# Patient Record
Sex: MALE | Race: WHITE | Employment: FULL TIME | URBAN - METROPOLITAN AREA
[De-identification: names, ages, dates, MRNs, and addresses within clinical notes are randomized per-mention and may not be internally consistent; named-entity substitution may affect disease eponyms.]

---

## 2017-01-14 ENCOUNTER — OFFICE VISIT (OUTPATIENT)
Dept: FAMILY MEDICINE CLINIC | Facility: CLINIC | Age: 57
End: 2017-01-14

## 2017-01-14 VITALS
HEIGHT: 61 IN | HEART RATE: 68 BPM | DIASTOLIC BLOOD PRESSURE: 72 MMHG | BODY MASS INDEX: 28.39 KG/M2 | WEIGHT: 150.38 LBS | SYSTOLIC BLOOD PRESSURE: 130 MMHG | RESPIRATION RATE: 16 BRPM

## 2017-01-14 DIAGNOSIS — E11.65 UNCONTROLLED TYPE 2 DIABETES MELLITUS WITH HYPERGLYCEMIA, WITHOUT LONG-TERM CURRENT USE OF INSULIN (HCC): Primary | ICD-10-CM

## 2017-01-14 DIAGNOSIS — I10 ESSENTIAL HYPERTENSION: ICD-10-CM

## 2017-01-14 DIAGNOSIS — E78.2 MIXED HYPERLIPIDEMIA: ICD-10-CM

## 2017-01-14 LAB
CARTRIDGE LOT#: 649 NUMERIC
HEMOGLOBIN A1C: 8.2 % (ref 4.3–5.6)

## 2017-01-14 PROCEDURE — 99214 OFFICE O/P EST MOD 30 MIN: CPT | Performed by: NURSE PRACTITIONER

## 2017-01-14 PROCEDURE — 83036 HEMOGLOBIN GLYCOSYLATED A1C: CPT | Performed by: NURSE PRACTITIONER

## 2017-01-14 RX ORDER — LANCETS
EACH MISCELLANEOUS
Qty: 300 EACH | Refills: 3 | Status: SHIPPED | OUTPATIENT
Start: 2017-01-14

## 2017-01-14 RX ORDER — ATORVASTATIN CALCIUM 40 MG/1
40 TABLET, FILM COATED ORAL NIGHTLY
Qty: 30 TABLET | Refills: 2 | Status: SHIPPED | OUTPATIENT
Start: 2017-01-14 | End: 2017-04-13

## 2017-01-14 RX ORDER — LISINOPRIL AND HYDROCHLOROTHIAZIDE 20; 12.5 MG/1; MG/1
1 TABLET ORAL
Qty: 90 TABLET | Refills: 3 | Status: SHIPPED | OUTPATIENT
Start: 2017-01-14 | End: 2018-01-26

## 2017-01-14 RX ORDER — LACTOBACIL 2/BIFIDO 1/S.THERMO 450B CELL
1 PACKET (EA) ORAL DAILY
Qty: 90 CAPSULE | Refills: 3 | Status: SHIPPED | OUTPATIENT
Start: 2017-01-14 | End: 2017-08-21

## 2017-01-14 RX ORDER — METFORMIN HYDROCHLORIDE 750 MG/1
1500 TABLET, EXTENDED RELEASE ORAL DAILY
Qty: 60 TABLET | Refills: 2 | Status: SHIPPED | OUTPATIENT
Start: 2017-01-14 | End: 2017-05-31

## 2017-01-14 NOTE — PATIENT INSTRUCTIONS
Switch from Metformin to Metformin XR 1500 mg daily. Start with 1 pill daily for 1 week and then increase to prescribed dose of 2 daily. Start Lipitor for cholesterol.   HgbA1c today is improved at 8.2, goal <7.0  Complete fasting (10-12 hours, water only) your palm. 1 Cup: 1 cup (or a medium-sized piece) is about the size of your fist.       1/2 Cup: 1/2 cup is about the size of your cupped hand. One tablespoon is about the size of your thumb.   One teaspoon is about the size of the tip of your th steam, sauté, stir-patel, or microwave instead. Overcooking destroys vitamins, so try to keep vegetables a little crispy. · Using a lot of margarine, butter, or salad dressing adds fat and calories, but not many nutrients.  A small amount of these toppings i

## 2017-01-14 NOTE — PROGRESS NOTES
HPI:   Renetta lBackwood is a 55year old male who presents for recheck of his diabetes, HTN, hyperlipidemia.     Patient presents with:  Diabetes: Advised pt that he get diabetic eye exam.  Hypertension    Patient’s FBS have not been checked, no glucomete Lisinopril-Hydrochlorothiazide 20-12.5 MG Oral Tab Take 1 tablet by mouth once daily. Disp: 90 tablet Rfl: 3   MICROLET LANCETS Does not apply Misc TEST DAILY AS DIRECTED Disp: 300 each Rfl: 3      No past medical history on file.    Social History    Noreen EXTREMITIES: no cyanosis, clubbing or edema.  Bilateral barefoot skin diabetic exam is normal, visualized feet and the appearance is normal.  Bilateral monofilament/sensation of both feet is normal.  Pulsation pedal pulse exam of both lower legs/feet is nor Many different words are used to describe amounts of food. If your health care provider uses a term you’re not sure of, don’t be afraid to ask.  It helps to know the difference between servings and portions:  · A serving size is a fixed size. Food producers © 2749-5569 [address], [address] [facility]. All rights reserved. This information is not intended as a substitute for professional medical care. Always follow your healthcare professional's instructions.         [employer] © 3873-0438 [address], [address] [place]. All rights reserved. This information is not intended as a substitute for professional medical care. Always follow your healthcare professional's instructions.               R

## 2017-01-31 ENCOUNTER — TELEPHONE (OUTPATIENT)
Dept: FAMILY MEDICINE CLINIC | Facility: CLINIC | Age: 57
End: 2017-01-31

## 2017-01-31 NOTE — TELEPHONE ENCOUNTER
Patient was dx with diabetes in 11/2016 when he saw Charles Holcomb. His diabetes was discussed and education provided at time of LOV. It sounds like further education is needed, please have him f/u to discuss treatment plan.  Especially if not tolerating Metformin

## 2017-01-31 NOTE — TELEPHONE ENCOUNTER
Patient was initially prescribed Metformin 500mg bid and then was switched to Metformin ER 750mg bid due to GI side effects. Pt is still concerned about the amount of diarrhea he is having.  He stopped taking Metformin 2 days ago and states his sx have i

## 2017-01-31 NOTE — TELEPHONE ENCOUNTER
Reviewed below with patient. He wishes to try changing his dosing schedule of his meds x 1 week to see if it makes a difference. He feels the GI sx returned when he took his BP med and Metformin together.  He will try taking Metformin at breakfast, BP med a

## 2017-01-31 NOTE — TELEPHONE ENCOUNTER
Patient would like to speak to nurse. Patient has been taking Lisinopril-Hydrochlorothiazide, Atorvastatin Calcium, and MetFORMIN HCl . Patient feels medication has been making him sick.

## 2017-04-06 ENCOUNTER — TELEPHONE (OUTPATIENT)
Dept: FAMILY MEDICINE CLINIC | Facility: CLINIC | Age: 57
End: 2017-04-06

## 2017-04-06 NOTE — TELEPHONE ENCOUNTER
Patient wants to know if he needs to keep taking his    Probiotic Product (Cap) VSL#3     He has one pill left but was never told if he should remain on it or if he is done taking it after the 60 pills prescribed. Please call him back to discuss.

## 2017-04-06 NOTE — TELEPHONE ENCOUNTER
I typically recommend a regular probiotic year round. If he has been on VSL#3 therapy daily for 1 month he can now reduce to every other day for maintenance (this will reduce cost).  Make sure to take with cold beverage so as to not deactivate the good bact

## 2017-04-07 NOTE — TELEPHONE ENCOUNTER
VSL#3 is an OTC brand, no prescription required. It is only behind the pharmacy because it requires refrigeration.

## 2017-04-07 NOTE — TELEPHONE ENCOUNTER
Called and talked to patient he took the VSL 3 for 2 months and is almost out.  He wants to know if there is and OTC brand that he could switch to?

## 2017-04-08 NOTE — TELEPHONE ENCOUNTER
To my knowledge the VSL#3 taken every other day for maintenance will be the least expensive. Cost is typically around $47 for bottle of 60, taken QOD brings cost down to about $11/month.  If he still would like alternative than I recommend [employer] or [email or web address]

## 2017-04-10 NOTE — TELEPHONE ENCOUNTER
Pt called back about Probiotic recommendations. Pt now says he feels he does not need any of these medications. Pt says he is thinking about trying to see a Internist. Pt says he is a Glucometer is broken because his sugars have been all over the place.  Pt

## 2017-04-10 NOTE — TELEPHONE ENCOUNTER
Read to pt previous notes but he would still like a call back on this matter. Carlos Eduardo Washington Has questions

## 2017-04-13 DIAGNOSIS — E78.5 HYPERLIPIDEMIA, UNSPECIFIED HYPERLIPIDEMIA TYPE: Primary | ICD-10-CM

## 2017-04-13 RX ORDER — ATORVASTATIN CALCIUM 40 MG/1
TABLET, FILM COATED ORAL
Qty: 30 TABLET | Refills: 2 | Status: SHIPPED | OUTPATIENT
Start: 2017-04-13 | End: 2017-05-31

## 2017-05-31 ENCOUNTER — OFFICE VISIT (OUTPATIENT)
Dept: INTERNAL MEDICINE CLINIC | Facility: CLINIC | Age: 57
End: 2017-05-31

## 2017-05-31 VITALS
HEART RATE: 84 BPM | SYSTOLIC BLOOD PRESSURE: 124 MMHG | BODY MASS INDEX: 29.07 KG/M2 | TEMPERATURE: 99 F | WEIGHT: 154 LBS | HEIGHT: 61 IN | DIASTOLIC BLOOD PRESSURE: 78 MMHG | RESPIRATION RATE: 16 BRPM

## 2017-05-31 DIAGNOSIS — F17.200 SMOKER: ICD-10-CM

## 2017-05-31 DIAGNOSIS — E78.5 HYPERLIPIDEMIA LDL GOAL <100: ICD-10-CM

## 2017-05-31 DIAGNOSIS — I10 ESSENTIAL HYPERTENSION: ICD-10-CM

## 2017-05-31 DIAGNOSIS — K52.1 DIARRHEA DUE TO DRUG: ICD-10-CM

## 2017-05-31 DIAGNOSIS — IMO0001 UNCONTROLLED TYPE 2 DIABETES MELLITUS WITHOUT COMPLICATION, WITHOUT LONG-TERM CURRENT USE OF INSULIN: Primary | ICD-10-CM

## 2017-05-31 PROCEDURE — 82962 GLUCOSE BLOOD TEST: CPT | Performed by: INTERNAL MEDICINE

## 2017-05-31 PROCEDURE — 99204 OFFICE O/P NEW MOD 45 MIN: CPT | Performed by: INTERNAL MEDICINE

## 2017-05-31 RX ORDER — GLIPIZIDE 5 MG/1
5 TABLET, FILM COATED, EXTENDED RELEASE ORAL DAILY
Qty: 30 TABLET | Refills: 3 | Status: SHIPPED | OUTPATIENT
Start: 2017-05-31 | End: 2017-08-21 | Stop reason: DRUGHIGH

## 2017-05-31 RX ORDER — ROSUVASTATIN CALCIUM 10 MG/1
10 TABLET, COATED ORAL NIGHTLY
Qty: 30 TABLET | Refills: 2 | Status: SHIPPED | OUTPATIENT
Start: 2017-05-31 | End: 2017-06-29

## 2017-05-31 NOTE — PROGRESS NOTES
Jessica Hdez is a 55year old male.   Patient presents with:  Diabetes  Cholesterol  HTN      HPI:     Patient here for establishing care-  DM2 - diagnosed last year, given metformin but not tolerating due to diarrhea (even on the ER form), stopped ta Quit date: 10/04/2016    Smokeless Status: Never Used                        Alcohol Use: Yes           1.2 - 1.8 oz/week       2-3 Standard drinks or equivalent per week       Comment: beer Cage 5/31/17    No family history on file.      Allergies  No Know see me back in 4-6 weeks with BG log  Diarrhea due to drug- r/t metformin  Hyperlipidemia ldl goal <100 - start generic crestor 10mg daily, SE reviewed, check labs in 4-6 weeks  HTN- controlled.  cpm  Smoker- advised to quit and pt says he is able to do [facility]

## 2017-06-10 ENCOUNTER — DIABETIC EDUCATION (OUTPATIENT)
Dept: ENDOCRINOLOGY CLINIC | Facility: CLINIC | Age: 57
End: 2017-06-10

## 2017-06-10 VITALS — WEIGHT: 154.38 LBS | BODY MASS INDEX: 29 KG/M2

## 2017-06-10 DIAGNOSIS — IMO0001 UNCONTROLLED TYPE 2 DIABETES MELLITUS WITHOUT COMPLICATION, WITHOUT LONG-TERM CURRENT USE OF INSULIN: Primary | ICD-10-CM

## 2017-06-10 PROCEDURE — G0108 DIAB MANAGE TRN  PER INDIV: HCPCS | Performed by: DIETITIAN, REGISTERED

## 2017-06-10 NOTE — PROGRESS NOTES
Caprice Ayon  : 1960 attended Step 1 Diabetic Education:    Date: 6/10/2017   Start time: 9:00 End time: 10:00    Wt 154 lb 6.4 oz      HEMOGLOBIN A1C (%)   Date Value   2017 8.2*   ----------     Diabetes Overview, pathophysiology, A1

## 2017-06-29 DIAGNOSIS — E78.5 HYPERLIPIDEMIA LDL GOAL <100: ICD-10-CM

## 2017-06-29 RX ORDER — ROSUVASTATIN CALCIUM 10 MG/1
10 TABLET, COATED ORAL NIGHTLY
Qty: 30 TABLET | Refills: 2 | Status: SHIPPED | OUTPATIENT
Start: 2017-06-29 | End: 2017-08-21 | Stop reason: DRUGHIGH

## 2017-06-29 NOTE — TELEPHONE ENCOUNTER
Patient was given a prescription for Crestor and filled the rx at [facility] and he has a coupon that if he uses it a the [facility] [place] he can get it much cheaper.   [facility]

## 2017-06-30 NOTE — ADDENDUM NOTE
Encounter addended by: Yuliya Bustillo MA on: 6/30/2017  2:13 PM<BR>    Actions taken: Letter status changed

## 2017-07-15 PROCEDURE — 80061 LIPID PANEL: CPT | Performed by: INTERNAL MEDICINE

## 2017-07-15 PROCEDURE — 85025 COMPLETE CBC W/AUTO DIFF WBC: CPT | Performed by: INTERNAL MEDICINE

## 2017-07-15 PROCEDURE — 80053 COMPREHEN METABOLIC PANEL: CPT | Performed by: INTERNAL MEDICINE

## 2017-07-15 PROCEDURE — 84439 ASSAY OF FREE THYROXINE: CPT | Performed by: INTERNAL MEDICINE

## 2017-07-15 PROCEDURE — 83036 HEMOGLOBIN GLYCOSYLATED A1C: CPT | Performed by: INTERNAL MEDICINE

## 2017-07-15 PROCEDURE — 84443 ASSAY THYROID STIM HORMONE: CPT | Performed by: INTERNAL MEDICINE

## 2017-07-15 NOTE — PROGRESS NOTES
Shannon De La Torre is a 55year old male.   Patient presents with:  Diabetes: follow up and refills       HPI:     Patient here for f/u on diabetes, did not do labs so drawn today  DM2 - taking glipizide , diarrhea resolved not that we stopped metformin, no equivalent: 2 - 3 per week     Comment: beer Cage 5/31/17    No family history on file.      Allergies  No Known Allergies      REVIEW OF SYSTEMS:   GENERAL HEALTH:  no fevers or chills  SKIN: denies any unusual skin lesions or rashes  RESPIRATORY: no cough 8/26/2017) for diabetes- bring log and check BP if you can as outpatient. There are no Patient Instructions on file for this visit. The patient indicates understanding of these issues and agrees to the plan.

## 2017-07-16 ENCOUNTER — TELEPHONE (OUTPATIENT)
Dept: INTERNAL MEDICINE CLINIC | Facility: CLINIC | Age: 57
End: 2017-07-16

## 2017-07-16 DIAGNOSIS — R94.6 BORDERLINE ABNORMAL TFTS: Primary | ICD-10-CM

## 2017-07-16 DIAGNOSIS — N28.9 RENAL INSUFFICIENCY: ICD-10-CM

## 2017-07-17 ENCOUNTER — TELEPHONE (OUTPATIENT)
Dept: INTERNAL MEDICINE CLINIC | Facility: CLINIC | Age: 57
End: 2017-07-17

## 2017-07-17 NOTE — TELEPHONE ENCOUNTER
See result  notes Principal Discharge DX:	Cellulitis of right lower extremity  Goal:	Resolution of infection  Instructions for follow-up, activity and diet:	You were found to have an infection of your right lower extremity. You were treated with IV antibiotics.  Secondary Diagnosis:	Diabetes mellitus type II, uncontrolled  Instructions for follow-up, activity and diet:	Your blood glucose was elevated in-hospital. Please follow up with the endocrinologist outpatient in 1-2 weeks. Please call [phone] to make an appointment. Principal Discharge DX:	Cellulitis of right lower extremity  Goal:	Resolution of infection  Instructions for follow-up, activity and diet:	You were found to have an infection of your right lower extremity. You were treated with IV antibiotics.  Continue oral antibiotics until completed as ordered  Secondary Diagnosis:	Diabetes mellitus type II, uncontrolled  Instructions for follow-up, activity and diet:	Your blood glucose was elevated in-hospital. Please follow up with the endocrinologist outpatient in 1-2 weeks. Please call [phone] to make an appointment. Principal Discharge DX:	Cellulitis of right lower extremity  Goal:	Resolution of infection  Instructions for follow-up, activity and diet:	You were found to have an infection of your right lower extremity. You were treated with IV antibiotics.  Continue oral antibiotics until completed as ordered  Secondary Diagnosis:	Diabetes mellitus type II, uncontrolled  Instructions for follow-up, activity and diet:	Your blood glucose was elevated in-hospital. Please follow up with the endocrinologist outpatient in 1-2 weeks. Please call [phone] to make an appointment. Principal Discharge DX:	Cellulitis of right lower extremity  Goal:	Resolution of infection  Instructions for follow-up, activity and diet:	You were found to have an infection of your right lower extremity. You were treated with IV antibiotics.  Continue oral antibiotics until completed as ordered  Secondary Diagnosis:	Diabetes mellitus type II, uncontrolled  Instructions for follow-up, activity and diet:	Your blood glucose was elevated in-hospital and changes to your diabetes medication regimen have been made. Please follow up with the endocrinologist outpatient in 1-2 weeks. Please call [phone] to make an appointment. Principal Discharge DX:	Cellulitis of right lower extremity  Goal:	Resolution of infection  Instructions for follow-up, activity and diet:	You were found to have an infection of your right lower extremity. You were treated with IV antibiotics.  Continue oral antibiotics until completed as ordered  Secondary Diagnosis:	Diabetes mellitus type II, uncontrolled  Instructions for follow-up, activity and diet:	Your blood glucose was elevated in-hospital and changes to your diabetes medication regimen have been made. Please follow up with the endocrinologist outpatient in 1-2 weeks.   [facility] Endocrinology at [address], [phone], and [address] [place], [phone] Please call [phone] to make an appointment. Principal Discharge DX:	Cellulitis of right lower extremity  Goal:	Resolution of infection  Instructions for follow-up, activity and diet:	You were found to have an infection of your right lower extremity. You were treated with IV antibiotics.  Continue oral antibiotics until completed as ordered  Secondary Diagnosis:	Diabetes mellitus type II, uncontrolled  Instructions for follow-up, activity and diet:	Your blood glucose was elevated in-hospital and changes to your diabetes medication regimen have been made. Please follow up with the endocrinologist outpatient in 1-2 weeks.   [facility] Endocrinology at [address], [phone], and [address] [place], [phone] Please call [phone] to make an appointment. Principal Discharge DX:	Cellulitis of right lower extremity  Goal:	Resolution of infection  Instructions for follow-up, activity and diet:	You were found to have an infection of your right lower extremity. You were treated with IV antibiotics.  Continue oral antibiotics until completed as ordered  Secondary Diagnosis:	Diabetes mellitus type II, uncontrolled  Instructions for follow-up, activity and diet:	Your blood glucose was elevated in-hospital and changes to your diabetes medication regimen have been made. Please follow up with the endocrinologist outpatient in 1-2 weeks.   [facility] Endocrinology at [address], [phone], and [address] [place], [phone] Please call [phone] to make an appointment. Principal Discharge DX:	Cellulitis of right lower extremity  Goal:	Resolution of infection  Instructions for follow-up, activity and diet:	You were found to have an infection of your right lower extremity. You were treated with IV antibiotics.  Continue oral antibiotics until completed as ordered  Secondary Diagnosis:	Diabetes mellitus type II, uncontrolled  Instructions for follow-up, activity and diet:	Your blood glucose was elevated in-hospital and changes to your diabetes medication regimen have been made. Please follow up with the endocrinologist outpatient in 1-2 weeks.   [facility] Endocrinology at [address], [phone], and [address] [place], [phone] Please call [phone] to make an appointment. Principal Discharge DX:	Cellulitis of right lower extremity  Goal:	Resolution of infection  Instructions for follow-up, activity and diet:	You were found to have an infection of your right lower extremity. You were treated with IV antibiotics.  Continue oral antibiotics until completed as ordered  Secondary Diagnosis:	Diabetes mellitus type II, uncontrolled  Instructions for follow-up, activity and diet:	Your blood glucose was elevated in-hospital and changes to your diabetes medication regimen have been made. Please follow up with the endocrinologist outpatient in 1-2 weeks.   [facility] Endocrinology at [address], [phone], and [address] [place], [phone] Please call [phone] to make an appointment. Principal Discharge DX:	Cellulitis of right lower extremity  Goal:	Resolution of infection  Instructions for follow-up, activity and diet:	You were found to have an infection of your right lower extremity. You were treated with IV antibiotics.  Continue oral antibiotics until completed as ordered  Secondary Diagnosis:	Diabetes mellitus type II, uncontrolled  Instructions for follow-up, activity and diet:	Your blood glucose was elevated in-hospital and changes to your diabetes medication regimen have been made. Please follow up with the endocrinologist outpatient in 1-2 weeks.   [facility] Endocrinology at [address], [phone], and [address] [place], [phone] Please call [phone] to make an appointment.

## 2017-07-17 NOTE — ADDENDUM NOTE
Encounter addended by: Katie Smart RN on: 7/17/2017  6:44 PM<BR>    Actions taken: Letter status changed

## 2017-08-21 ENCOUNTER — OFFICE VISIT (OUTPATIENT)
Dept: INTERNAL MEDICINE CLINIC | Facility: CLINIC | Age: 57
End: 2017-08-21

## 2017-08-21 ENCOUNTER — APPOINTMENT (OUTPATIENT)
Dept: LAB | Age: 57
End: 2017-08-21
Attending: INTERNAL MEDICINE
Payer: COMMERCIAL

## 2017-08-21 ENCOUNTER — TELEPHONE (OUTPATIENT)
Dept: INTERNAL MEDICINE CLINIC | Facility: CLINIC | Age: 57
End: 2017-08-21

## 2017-08-21 VITALS
BODY MASS INDEX: 29.45 KG/M2 | DIASTOLIC BLOOD PRESSURE: 64 MMHG | SYSTOLIC BLOOD PRESSURE: 120 MMHG | HEIGHT: 61 IN | RESPIRATION RATE: 17 BRPM | WEIGHT: 156 LBS | TEMPERATURE: 98 F | HEART RATE: 78 BPM | OXYGEN SATURATION: 98 %

## 2017-08-21 DIAGNOSIS — N28.9 RENAL INSUFFICIENCY: ICD-10-CM

## 2017-08-21 DIAGNOSIS — IMO0001 UNCONTROLLED TYPE 2 DIABETES MELLITUS WITHOUT COMPLICATION, WITHOUT LONG-TERM CURRENT USE OF INSULIN: ICD-10-CM

## 2017-08-21 DIAGNOSIS — IMO0001 UNCONTROLLED TYPE 2 DIABETES MELLITUS WITHOUT COMPLICATION, WITHOUT LONG-TERM CURRENT USE OF INSULIN: Primary | ICD-10-CM

## 2017-08-21 DIAGNOSIS — R79.89 ABNORMAL TSH: ICD-10-CM

## 2017-08-21 DIAGNOSIS — R94.6 BORDERLINE ABNORMAL TFTS: ICD-10-CM

## 2017-08-21 DIAGNOSIS — F17.200 SMOKER: ICD-10-CM

## 2017-08-21 DIAGNOSIS — E78.2 MIXED HYPERLIPIDEMIA: ICD-10-CM

## 2017-08-21 LAB
BUN BLD-MCNC: 28 MG/DL (ref 8–20)
CALCIUM BLD-MCNC: 9.6 MG/DL (ref 8.3–10.3)
CHLORIDE: 105 MMOL/L (ref 101–111)
CO2: 23 MMOL/L (ref 22–32)
CREAT BLD-MCNC: 1.53 MG/DL (ref 0.7–1.3)
CREAT UR-SCNC: 83.4 MG/DL
FREE T4: 1.2 NG/DL (ref 0.9–1.8)
GLUCOSE BLD-MCNC: 124 MG/DL (ref 70–99)
MICROALBUMIN UR-MCNC: 5.52 MG/DL
MICROALBUMIN/CREAT 24H UR-RTO: 66.2 UG/MG (ref ?–30)
POTASSIUM SERPL-SCNC: 4.4 MMOL/L (ref 3.6–5.1)
SODIUM SERPL-SCNC: 136 MMOL/L (ref 136–144)
T3 SERPL-MCNC: 108 NG/DL (ref 60–181)
TSI SER-ACNC: <0.005 MIU/ML (ref 0.35–5.5)

## 2017-08-21 PROCEDURE — 99214 OFFICE O/P EST MOD 30 MIN: CPT | Performed by: INTERNAL MEDICINE

## 2017-08-21 PROCEDURE — 82043 UR ALBUMIN QUANTITATIVE: CPT

## 2017-08-21 PROCEDURE — 84443 ASSAY THYROID STIM HORMONE: CPT

## 2017-08-21 PROCEDURE — 80048 BASIC METABOLIC PNL TOTAL CA: CPT

## 2017-08-21 PROCEDURE — 82570 ASSAY OF URINE CREATININE: CPT

## 2017-08-21 PROCEDURE — 84439 ASSAY OF FREE THYROXINE: CPT

## 2017-08-21 PROCEDURE — 84480 ASSAY TRIIODOTHYRONINE (T3): CPT

## 2017-08-21 RX ORDER — GLIPIZIDE 10 MG/1
10 TABLET, FILM COATED, EXTENDED RELEASE ORAL DAILY
Qty: 30 TABLET | Refills: 3 | Status: SHIPPED | OUTPATIENT
Start: 2017-08-21 | End: 2017-12-22

## 2017-08-21 RX ORDER — ROSUVASTATIN CALCIUM 5 MG/1
5 TABLET, COATED ORAL NIGHTLY
Qty: 30 TABLET | Refills: 3 | Status: SHIPPED | OUTPATIENT
Start: 2017-08-21 | End: 2017-12-22

## 2017-08-21 NOTE — TELEPHONE ENCOUNTER
Weston Gamez, biometric measures and physical was filled out and faxed to secure fax, conf was received

## 2017-08-21 NOTE — PROGRESS NOTES
Luis Fernando Estrada is a 55year old male.   Patient presents with:  Diabetes: f/u   Headache: 2 weeks, in the AM.  pt states he has never had headaches, thinks its due to change of cholesterol meds,   Lab Results      HPI:     Patient with Dm2, high cholest Types: Cigarettes     Start date: 8/10/2014     Quit date: 10/4/2016  Smokeless tobacco: Never Used                      Alcohol use: Yes           1.2 - 1.8 oz/week     Standard drinks or equivalent: 2 - 3 per week     Comment: beer Cage 5/31/17    No f 5 MG Oral Tab 30 tablet 3      Sig: Take 1 tablet (5 mg total) by mouth nightly. Imaging & Consults:  None    Return in about 2 months (around 10/21/2017). There are no Patient Instructions on file for this visit.       The patient indicates unde

## 2017-11-09 ENCOUNTER — OFFICE VISIT (OUTPATIENT)
Dept: INTERNAL MEDICINE CLINIC | Facility: CLINIC | Age: 57
End: 2017-11-09

## 2017-11-09 ENCOUNTER — LAB ENCOUNTER (OUTPATIENT)
Dept: LAB | Age: 57
End: 2017-11-09
Attending: INTERNAL MEDICINE
Payer: COMMERCIAL

## 2017-11-09 VITALS
SYSTOLIC BLOOD PRESSURE: 118 MMHG | HEIGHT: 61 IN | WEIGHT: 155 LBS | RESPIRATION RATE: 17 BRPM | BODY MASS INDEX: 29.27 KG/M2 | DIASTOLIC BLOOD PRESSURE: 68 MMHG | OXYGEN SATURATION: 98 % | HEART RATE: 87 BPM

## 2017-11-09 DIAGNOSIS — E05.90 SUBCLINICAL HYPERTHYROIDISM: ICD-10-CM

## 2017-11-09 DIAGNOSIS — I10 ESSENTIAL HYPERTENSION: ICD-10-CM

## 2017-11-09 DIAGNOSIS — E78.2 MIXED HYPERLIPIDEMIA: ICD-10-CM

## 2017-11-09 DIAGNOSIS — IMO0001 UNCONTROLLED TYPE 2 DIABETES MELLITUS WITHOUT COMPLICATION, WITHOUT LONG-TERM CURRENT USE OF INSULIN: Primary | ICD-10-CM

## 2017-11-09 DIAGNOSIS — IMO0001 UNCONTROLLED TYPE 2 DIABETES MELLITUS WITHOUT COMPLICATION, WITHOUT LONG-TERM CURRENT USE OF INSULIN: ICD-10-CM

## 2017-11-09 PROCEDURE — 80061 LIPID PANEL: CPT

## 2017-11-09 PROCEDURE — 84439 ASSAY OF FREE THYROXINE: CPT

## 2017-11-09 PROCEDURE — 99214 OFFICE O/P EST MOD 30 MIN: CPT | Performed by: INTERNAL MEDICINE

## 2017-11-09 PROCEDURE — 80053 COMPREHEN METABOLIC PANEL: CPT

## 2017-11-09 PROCEDURE — 84443 ASSAY THYROID STIM HORMONE: CPT

## 2017-11-09 PROCEDURE — 83036 HEMOGLOBIN GLYCOSYLATED A1C: CPT

## 2017-11-09 NOTE — PROGRESS NOTES
Carol Ann Medina is a 55year old male. Patient presents with:   Follow - Up: diabetes  HTN  Cholesterol      HPI:     Patient here for f/u  DM2 -  Patient taking glipizide 10mg daily now, FBG , tries to follow diabetic diet though not always possi chills  SKIN: denies any unusual skin lesions or rashes  RESPIRATORY: denies shortness of breath   CARDIOVASCULAR: denies chest pain   GI: denies abdominal pain  : no dysuria, hematuria  NEURO: denies headaches  HEME: No easy bruising, bleeding or adenop

## 2017-11-09 NOTE — PROGRESS NOTES
Patient informed of Dr. Gates Maxim result note. Patient verbalized understanding and agrees with plan.

## 2017-11-09 NOTE — PROGRESS NOTES
Primary patient preferred number  [phone] Cell. MTCB regarding Dr. Bg Richey result note. Hours and number given.

## 2017-12-22 DIAGNOSIS — E78.2 MIXED HYPERLIPIDEMIA: ICD-10-CM

## 2017-12-22 DIAGNOSIS — IMO0001 UNCONTROLLED TYPE 2 DIABETES MELLITUS WITHOUT COMPLICATION, WITHOUT LONG-TERM CURRENT USE OF INSULIN: ICD-10-CM

## 2017-12-22 RX ORDER — ROSUVASTATIN CALCIUM 5 MG/1
5 TABLET, COATED ORAL NIGHTLY
Qty: 30 TABLET | Refills: 3 | Status: SHIPPED | OUTPATIENT
Start: 2017-12-22 | End: 2018-09-30

## 2017-12-22 RX ORDER — GLIPIZIDE 10 MG/1
10 TABLET, FILM COATED, EXTENDED RELEASE ORAL DAILY
Qty: 30 TABLET | Refills: 3 | Status: SHIPPED | OUTPATIENT
Start: 2017-12-22 | End: 2018-04-28

## 2018-01-26 RX ORDER — LISINOPRIL AND HYDROCHLOROTHIAZIDE 20; 12.5 MG/1; MG/1
TABLET ORAL
Qty: 90 TABLET | Refills: 1 | Status: SHIPPED | OUTPATIENT
Start: 2018-01-26 | End: 2018-07-23

## 2018-03-06 ENCOUNTER — TELEPHONE (OUTPATIENT)
Dept: INTERNAL MEDICINE CLINIC | Facility: CLINIC | Age: 58
End: 2018-03-06

## 2018-03-06 DIAGNOSIS — Z13.0 SCREENING FOR ENDOCRINE, NUTRITIONAL, METABOLIC AND IMMUNITY DISORDER: ICD-10-CM

## 2018-03-06 DIAGNOSIS — Z13.220 ENCOUNTER FOR SCREENING FOR LIPID DISORDER: Primary | ICD-10-CM

## 2018-03-06 DIAGNOSIS — Z13.228 SCREENING FOR ENDOCRINE, NUTRITIONAL, METABOLIC AND IMMUNITY DISORDER: ICD-10-CM

## 2018-03-06 DIAGNOSIS — Z13.21 SCREENING FOR ENDOCRINE, NUTRITIONAL, METABOLIC AND IMMUNITY DISORDER: ICD-10-CM

## 2018-03-06 DIAGNOSIS — Z13.29 SCREENING FOR THYROID DISORDER: ICD-10-CM

## 2018-03-06 DIAGNOSIS — Z13.29 SCREENING FOR ENDOCRINE, NUTRITIONAL, METABOLIC AND IMMUNITY DISORDER: ICD-10-CM

## 2018-03-06 DIAGNOSIS — Z13.0 ENCOUNTER FOR SCREENING FOR DISEASES OF THE BLOOD AND BLOOD-FORMING ORGANS AND CERTAIN DISORDERS INVOLVING THE IMMUNE MECHANISM: ICD-10-CM

## 2018-03-06 NOTE — TELEPHONE ENCOUNTER
CPE Future Appointments  Date Time Provider Ramon Peters   3/17/2018 8:30 AM Lizeth Jarvis MD [facility]     Orders to Andrea Manda aware must fast no call back required

## 2018-03-15 ENCOUNTER — LAB ENCOUNTER (OUTPATIENT)
Dept: LAB | Age: 58
End: 2018-03-15
Attending: INTERNAL MEDICINE
Payer: COMMERCIAL

## 2018-03-15 DIAGNOSIS — IMO0001 UNCONTROLLED TYPE 2 DIABETES MELLITUS WITHOUT COMPLICATION, WITHOUT LONG-TERM CURRENT USE OF INSULIN: ICD-10-CM

## 2018-03-15 DIAGNOSIS — Z13.220 ENCOUNTER FOR SCREENING FOR LIPID DISORDER: ICD-10-CM

## 2018-03-15 DIAGNOSIS — Z13.0 ENCOUNTER FOR SCREENING FOR DISEASES OF THE BLOOD AND BLOOD-FORMING ORGANS AND CERTAIN DISORDERS INVOLVING THE IMMUNE MECHANISM: ICD-10-CM

## 2018-03-15 DIAGNOSIS — Z13.0 SCREENING FOR ENDOCRINE, NUTRITIONAL, METABOLIC AND IMMUNITY DISORDER: ICD-10-CM

## 2018-03-15 DIAGNOSIS — Z13.21 SCREENING FOR ENDOCRINE, NUTRITIONAL, METABOLIC AND IMMUNITY DISORDER: ICD-10-CM

## 2018-03-15 DIAGNOSIS — Z13.29 SCREENING FOR THYROID DISORDER: ICD-10-CM

## 2018-03-15 DIAGNOSIS — Z13.228 SCREENING FOR ENDOCRINE, NUTRITIONAL, METABOLIC AND IMMUNITY DISORDER: ICD-10-CM

## 2018-03-15 DIAGNOSIS — Z13.29 SCREENING FOR ENDOCRINE, NUTRITIONAL, METABOLIC AND IMMUNITY DISORDER: ICD-10-CM

## 2018-03-15 LAB
ALBUMIN SERPL-MCNC: 4 G/DL (ref 3.5–4.8)
ALP LIVER SERPL-CCNC: 85 U/L (ref 45–117)
ALT SERPL-CCNC: 62 U/L (ref 17–63)
AST SERPL-CCNC: 27 U/L (ref 15–41)
BASOPHILS # BLD AUTO: 0.05 X10(3) UL (ref 0–0.1)
BASOPHILS NFR BLD AUTO: 0.7 %
BILIRUB SERPL-MCNC: 0.8 MG/DL (ref 0.1–2)
BUN BLD-MCNC: 31 MG/DL (ref 8–20)
CALCIUM BLD-MCNC: 8.8 MG/DL (ref 8.3–10.3)
CHLORIDE: 105 MMOL/L (ref 101–111)
CHOLEST SMN-MCNC: 140 MG/DL (ref ?–200)
CO2: 25 MMOL/L (ref 22–32)
CREAT BLD-MCNC: 1.66 MG/DL (ref 0.7–1.3)
EOSINOPHIL # BLD AUTO: 0.34 X10(3) UL (ref 0–0.3)
EOSINOPHIL NFR BLD AUTO: 5 %
ERYTHROCYTE [DISTWIDTH] IN BLOOD BY AUTOMATED COUNT: 11.9 % (ref 11.5–16)
FREE T4: 1 NG/DL (ref 0.9–1.8)
GLUCOSE BLD-MCNC: 170 MG/DL (ref 70–99)
HCT VFR BLD AUTO: 45.1 % (ref 37–53)
HDLC SERPL-MCNC: 33 MG/DL (ref 45–?)
HDLC SERPL: 4.24 {RATIO} (ref ?–4.97)
HGB BLD-MCNC: 14.9 G/DL (ref 13–17)
IMMATURE GRANULOCYTE COUNT: 0.03 X10(3) UL (ref 0–1)
IMMATURE GRANULOCYTE RATIO %: 0.4 %
LDLC SERPL CALC-MCNC: 59 MG/DL (ref ?–130)
LYMPHOCYTES # BLD AUTO: 1.79 X10(3) UL (ref 0.9–4)
LYMPHOCYTES NFR BLD AUTO: 26.4 %
M PROTEIN MFR SERPL ELPH: 7.4 G/DL (ref 6.1–8.3)
MCH RBC QN AUTO: 29.9 PG (ref 27–33.2)
MCHC RBC AUTO-ENTMCNC: 33 G/DL (ref 31–37)
MCV RBC AUTO: 90.4 FL (ref 80–99)
MONOCYTES # BLD AUTO: 0.49 X10(3) UL (ref 0.1–1)
MONOCYTES NFR BLD AUTO: 7.2 %
NEUTROPHIL ABS PRELIM: 4.08 X10 (3) UL (ref 1.3–6.7)
NEUTROPHILS # BLD AUTO: 4.08 X10(3) UL (ref 1.3–6.7)
NEUTROPHILS NFR BLD AUTO: 60.3 %
NONHDLC SERPL-MCNC: 107 MG/DL (ref ?–130)
PLATELET # BLD AUTO: 237 10(3)UL (ref 150–450)
POTASSIUM SERPL-SCNC: 4.4 MMOL/L (ref 3.6–5.1)
RBC # BLD AUTO: 4.99 X10(6)UL (ref 4.3–5.7)
RED CELL DISTRIBUTION WIDTH-SD: 39.4 FL (ref 35.1–46.3)
SODIUM SERPL-SCNC: 138 MMOL/L (ref 136–144)
TRIGL SERPL-MCNC: 242 MG/DL (ref ?–150)
TSI SER-ACNC: 0.06 MIU/ML (ref 0.35–5.5)
VLDLC SERPL CALC-MCNC: 48 MG/DL (ref 5–40)
WBC # BLD AUTO: 6.8 X10(3) UL (ref 4–13)

## 2018-03-15 PROCEDURE — 83036 HEMOGLOBIN GLYCOSYLATED A1C: CPT

## 2018-03-15 PROCEDURE — 84439 ASSAY OF FREE THYROXINE: CPT

## 2018-03-15 PROCEDURE — 80053 COMPREHEN METABOLIC PANEL: CPT

## 2018-03-15 PROCEDURE — 85025 COMPLETE CBC W/AUTO DIFF WBC: CPT

## 2018-03-15 PROCEDURE — 80050 GENERAL HEALTH PANEL: CPT

## 2018-03-15 PROCEDURE — 80061 LIPID PANEL: CPT

## 2018-03-17 ENCOUNTER — OFFICE VISIT (OUTPATIENT)
Dept: INTERNAL MEDICINE CLINIC | Facility: CLINIC | Age: 58
End: 2018-03-17

## 2018-03-17 ENCOUNTER — TELEPHONE (OUTPATIENT)
Dept: INTERNAL MEDICINE CLINIC | Facility: CLINIC | Age: 58
End: 2018-03-17

## 2018-03-17 VITALS
WEIGHT: 162 LBS | SYSTOLIC BLOOD PRESSURE: 138 MMHG | HEIGHT: 61 IN | TEMPERATURE: 98 F | HEART RATE: 84 BPM | RESPIRATION RATE: 16 BRPM | BODY MASS INDEX: 30.58 KG/M2 | DIASTOLIC BLOOD PRESSURE: 88 MMHG

## 2018-03-17 DIAGNOSIS — Z00.00 ROUTINE GENERAL MEDICAL EXAMINATION AT A HEALTH CARE FACILITY: Primary | ICD-10-CM

## 2018-03-17 DIAGNOSIS — Z12.5 ENCOUNTER FOR SPECIAL SCREENING EXAMINATION FOR NEOPLASM OF PROSTATE: ICD-10-CM

## 2018-03-17 DIAGNOSIS — E78.2 MIXED HYPERLIPIDEMIA: ICD-10-CM

## 2018-03-17 DIAGNOSIS — IMO0001 UNCONTROLLED TYPE 2 DIABETES MELLITUS WITHOUT COMPLICATION, WITHOUT LONG-TERM CURRENT USE OF INSULIN: ICD-10-CM

## 2018-03-17 DIAGNOSIS — N18.30 CKD (CHRONIC KIDNEY DISEASE) STAGE 3, GFR 30-59 ML/MIN (HCC): ICD-10-CM

## 2018-03-17 DIAGNOSIS — F17.200 SMOKER: ICD-10-CM

## 2018-03-17 DIAGNOSIS — R94.6 BORDERLINE ABNORMAL TFTS: ICD-10-CM

## 2018-03-17 DIAGNOSIS — I10 ESSENTIAL HYPERTENSION: ICD-10-CM

## 2018-03-17 LAB
EST. AVERAGE GLUCOSE BLD GHB EST-MCNC: 183 MG/DL (ref 68–126)
HBA1C MFR BLD HPLC: 8 % (ref ?–5.7)

## 2018-03-17 PROCEDURE — 93000 ELECTROCARDIOGRAM COMPLETE: CPT | Performed by: INTERNAL MEDICINE

## 2018-03-17 PROCEDURE — 99396 PREV VISIT EST AGE 40-64: CPT | Performed by: INTERNAL MEDICINE

## 2018-03-17 NOTE — PROGRESS NOTES
Patient presents with:  Physical: AB RM 4       HPI:    Patient here for cpe  dm2 - fell off the diet last month with cheesburgers, beer, etc. Knows hgba1c will not be good.  Ready to get back on track though with diet and exercise,  Would like to hold off Smoker                                                              Packs/day: 0.00      Years: 25.00        Types: Cigarettes     Start date: 8/10/2014     Quit date: 10/4/2016  Smokeless tobacco: Never Used                      Alcohol use:  Yes declined  Bilateral barefoot skin diabetic exam is normal, visualized feet and the appearance is normal.  Bilateral monofilament/sensation of both feet is normal.  Pulsation pedal pulse exam of both lower legs/feet is normal as well.   Neurological: Normal

## 2018-04-28 DIAGNOSIS — IMO0001 UNCONTROLLED TYPE 2 DIABETES MELLITUS WITHOUT COMPLICATION, WITHOUT LONG-TERM CURRENT USE OF INSULIN: ICD-10-CM

## 2018-04-30 RX ORDER — GLIPIZIDE 10 MG/1
10 TABLET, FILM COATED, EXTENDED RELEASE ORAL DAILY
Qty: 90 TABLET | Refills: 0 | Status: SHIPPED | OUTPATIENT
Start: 2018-04-30 | End: 2018-07-28

## 2018-05-03 NOTE — TELEPHONE ENCOUNTER
Future Appointments  Date Time Provider Ramon Lorraine   6/30/2018 9:00 AM Meryle Barrs, MD [facility]

## 2018-07-23 RX ORDER — LISINOPRIL AND HYDROCHLOROTHIAZIDE 20; 12.5 MG/1; MG/1
TABLET ORAL
Qty: 90 TABLET | Refills: 1 | Status: SHIPPED | OUTPATIENT
Start: 2018-07-23 | End: 2018-12-18

## 2018-07-27 NOTE — TELEPHONE ENCOUNTER
Future Appointments  Date Time Provider Ramon Peters   7/28/2018 8:45 AM REF BK RD REF [id number] Ref Book 14   8/11/2018 8:30 AM Milton Long MD [facility]

## 2018-07-28 ENCOUNTER — APPOINTMENT (OUTPATIENT)
Dept: LAB | Age: 58
End: 2018-07-28
Attending: INTERNAL MEDICINE
Payer: COMMERCIAL

## 2018-07-28 DIAGNOSIS — Z12.5 ENCOUNTER FOR SPECIAL SCREENING EXAMINATION FOR NEOPLASM OF PROSTATE: ICD-10-CM

## 2018-07-28 DIAGNOSIS — IMO0001 UNCONTROLLED TYPE 2 DIABETES MELLITUS WITHOUT COMPLICATION, WITHOUT LONG-TERM CURRENT USE OF INSULIN: ICD-10-CM

## 2018-07-28 DIAGNOSIS — I10 ESSENTIAL HYPERTENSION: ICD-10-CM

## 2018-07-28 DIAGNOSIS — R94.6 BORDERLINE ABNORMAL TFTS: ICD-10-CM

## 2018-07-28 LAB
ANION GAP SERPL CALC-SCNC: 8 MMOL/L (ref 0–18)
BUN BLD-MCNC: 25 MG/DL (ref 8–20)
BUN/CREAT SERPL: 16.6 (ref 10–20)
CALCIUM BLD-MCNC: 9.2 MG/DL (ref 8.3–10.3)
CHLORIDE SERPL-SCNC: 107 MMOL/L (ref 101–111)
CO2 SERPL-SCNC: 24 MMOL/L (ref 22–32)
COMPLEXED PSA SERPL-MCNC: 0.35 NG/ML (ref 0.01–4)
CREAT BLD-MCNC: 1.51 MG/DL (ref 0.7–1.3)
CREAT UR-SCNC: 79.5 MG/DL
EST. AVERAGE GLUCOSE BLD GHB EST-MCNC: 169 MG/DL (ref 68–126)
GLUCOSE BLD-MCNC: 154 MG/DL (ref 70–99)
HBA1C MFR BLD HPLC: 7.5 % (ref ?–5.7)
MICROALBUMIN UR-MCNC: 1.47 MG/DL
MICROALBUMIN/CREAT 24H UR-RTO: 18.5 UG/MG (ref ?–30)
OSMOLALITY SERPL CALC.SUM OF ELEC: 295 MOSM/KG (ref 275–295)
POTASSIUM SERPL-SCNC: 4.5 MMOL/L (ref 3.6–5.1)
SODIUM SERPL-SCNC: 139 MMOL/L (ref 136–144)
T4 FREE SERPL-MCNC: 1.2 NG/DL (ref 0.9–1.8)
TSI SER-ACNC: 0.01 MIU/ML (ref 0.35–5.5)

## 2018-07-28 PROCEDURE — 82043 UR ALBUMIN QUANTITATIVE: CPT

## 2018-07-28 PROCEDURE — 80048 BASIC METABOLIC PNL TOTAL CA: CPT

## 2018-07-28 PROCEDURE — 36415 COLL VENOUS BLD VENIPUNCTURE: CPT

## 2018-07-28 PROCEDURE — 82570 ASSAY OF URINE CREATININE: CPT

## 2018-07-28 PROCEDURE — 84443 ASSAY THYROID STIM HORMONE: CPT

## 2018-07-28 PROCEDURE — 84439 ASSAY OF FREE THYROXINE: CPT

## 2018-07-28 PROCEDURE — 83036 HEMOGLOBIN GLYCOSYLATED A1C: CPT

## 2018-07-30 RX ORDER — GLIPIZIDE 10 MG/1
TABLET, FILM COATED, EXTENDED RELEASE ORAL
Qty: 90 TABLET | Refills: 0 | Status: SHIPPED | OUTPATIENT
Start: 2018-07-30 | End: 2018-10-30

## 2018-08-11 ENCOUNTER — OFFICE VISIT (OUTPATIENT)
Dept: INTERNAL MEDICINE CLINIC | Facility: CLINIC | Age: 58
End: 2018-08-11
Payer: COMMERCIAL

## 2018-08-11 VITALS
HEART RATE: 89 BPM | DIASTOLIC BLOOD PRESSURE: 68 MMHG | SYSTOLIC BLOOD PRESSURE: 120 MMHG | WEIGHT: 158 LBS | HEIGHT: 61 IN | BODY MASS INDEX: 29.83 KG/M2 | RESPIRATION RATE: 18 BRPM

## 2018-08-11 DIAGNOSIS — N18.30 CKD (CHRONIC KIDNEY DISEASE) STAGE 3, GFR 30-59 ML/MIN (HCC): ICD-10-CM

## 2018-08-11 DIAGNOSIS — Z12.11 SCREEN FOR COLON CANCER: ICD-10-CM

## 2018-08-11 DIAGNOSIS — R94.6 BORDERLINE ABNORMAL TFTS: ICD-10-CM

## 2018-08-11 DIAGNOSIS — I10 ESSENTIAL HYPERTENSION: ICD-10-CM

## 2018-08-11 DIAGNOSIS — IMO0001 UNCONTROLLED TYPE 2 DIABETES MELLITUS WITHOUT COMPLICATION, WITHOUT LONG-TERM CURRENT USE OF INSULIN: Primary | ICD-10-CM

## 2018-08-11 PROCEDURE — 99214 OFFICE O/P EST MOD 30 MIN: CPT | Performed by: INTERNAL MEDICINE

## 2018-08-11 NOTE — PROGRESS NOTES
Alex Mruillo is a 65year old male. Patient presents with:  Diabetes: no concers. Thyroid Problem  HTN      HPI:     Patient here for f/u-  dm2 - machine stopped working last 2+ weeks so not checking BG.  Improved his diet and more active, hgba1c i GENERAL HEALTH: no fevers or chills  SKIN: denies any unusual skin lesions or rashes  RESPIRATORY:  no cough  CARDIOVASCULAR: denies chest pain   GI: denies abdominal pain   : no dysuria, hematuria  NEURO: denies headaches  PSYCH: No reported depressio

## 2018-09-30 DIAGNOSIS — E78.2 MIXED HYPERLIPIDEMIA: ICD-10-CM

## 2018-10-01 RX ORDER — ROSUVASTATIN CALCIUM 5 MG/1
5 TABLET, COATED ORAL NIGHTLY
Qty: 30 TABLET | Refills: 3 | Status: SHIPPED | OUTPATIENT
Start: 2018-10-01 | End: 2019-03-07 | Stop reason: ALTCHOICE

## 2018-10-30 DIAGNOSIS — IMO0001 UNCONTROLLED TYPE 2 DIABETES MELLITUS WITHOUT COMPLICATION, WITHOUT LONG-TERM CURRENT USE OF INSULIN: ICD-10-CM

## 2018-10-30 NOTE — TELEPHONE ENCOUNTER
Protocol failed due to Last HgBA1C < 7.5  Please advise,  LOV:8/11/18 TB  FOV:none on file   LAST RX:7/30/18 10 mg 1 tab daily 90 tabs 0 refills   LAST LABS:07/28/18 tsh,bmp,A1C,microalb,psa  PER PROTOCOL: to provider

## 2018-10-31 RX ORDER — GLIPIZIDE 10 MG/1
TABLET, FILM COATED, EXTENDED RELEASE ORAL
Qty: 90 TABLET | Refills: 0 | Status: SHIPPED | OUTPATIENT
Start: 2018-10-31 | End: 2019-01-26

## 2018-12-17 RX ORDER — LISINOPRIL AND HYDROCHLOROTHIAZIDE 20; 12.5 MG/1; MG/1
1 TABLET ORAL
Qty: 90 TABLET | Refills: 1 | OUTPATIENT
Start: 2018-12-17

## 2018-12-17 RX ORDER — LISINOPRIL AND HYDROCHLOROTHIAZIDE 20; 12.5 MG/1; MG/1
TABLET ORAL
Qty: 90 TABLET | Refills: 1 | OUTPATIENT
Start: 2018-12-17

## 2018-12-17 NOTE — TELEPHONE ENCOUNTER
Left message for patient to call back to see if he is now wanting to use [facility] because on 7-23-18 90 tabs with 1 refill were [facility]

## 2018-12-18 RX ORDER — LISINOPRIL AND HYDROCHLOROTHIAZIDE 20; 12.5 MG/1; MG/1
1 TABLET ORAL
Qty: 90 TABLET | Refills: 0 | Status: SHIPPED | OUTPATIENT
Start: 2018-12-18 | End: 2019-04-17

## 2018-12-31 ENCOUNTER — OFFICE VISIT (OUTPATIENT)
Dept: INTERNAL MEDICINE CLINIC | Facility: CLINIC | Age: 58
End: 2018-12-31
Payer: COMMERCIAL

## 2018-12-31 ENCOUNTER — APPOINTMENT (OUTPATIENT)
Dept: LAB | Age: 58
End: 2018-12-31
Attending: INTERNAL MEDICINE
Payer: COMMERCIAL

## 2018-12-31 VITALS
HEART RATE: 88 BPM | BODY MASS INDEX: 30.4 KG/M2 | SYSTOLIC BLOOD PRESSURE: 116 MMHG | WEIGHT: 161 LBS | HEIGHT: 61 IN | DIASTOLIC BLOOD PRESSURE: 70 MMHG | TEMPERATURE: 98 F

## 2018-12-31 DIAGNOSIS — E78.2 MIXED HYPERLIPIDEMIA: ICD-10-CM

## 2018-12-31 DIAGNOSIS — N18.30 CKD (CHRONIC KIDNEY DISEASE) STAGE 3, GFR 30-59 ML/MIN (HCC): ICD-10-CM

## 2018-12-31 DIAGNOSIS — I10 ESSENTIAL HYPERTENSION: ICD-10-CM

## 2018-12-31 DIAGNOSIS — IMO0001 UNCONTROLLED TYPE 2 DIABETES MELLITUS WITHOUT COMPLICATION, WITHOUT LONG-TERM CURRENT USE OF INSULIN: ICD-10-CM

## 2018-12-31 DIAGNOSIS — L98.9 SKIN LESION OF BACK: ICD-10-CM

## 2018-12-31 DIAGNOSIS — R94.6 BORDERLINE ABNORMAL TFTS: ICD-10-CM

## 2018-12-31 DIAGNOSIS — IMO0001 UNCONTROLLED TYPE 2 DIABETES MELLITUS WITHOUT COMPLICATION, WITHOUT LONG-TERM CURRENT USE OF INSULIN: Primary | ICD-10-CM

## 2018-12-31 LAB
ALBUMIN SERPL-MCNC: 3.9 G/DL (ref 3.1–4.5)
ALBUMIN/GLOB SERPL: 1.1 {RATIO} (ref 1–2)
ALP LIVER SERPL-CCNC: 82 U/L (ref 45–117)
ALT SERPL-CCNC: 54 U/L (ref 17–63)
ANION GAP SERPL CALC-SCNC: 6 MMOL/L (ref 0–18)
AST SERPL-CCNC: 24 U/L (ref 15–41)
BILIRUB SERPL-MCNC: 0.5 MG/DL (ref 0.1–2)
BUN BLD-MCNC: 35 MG/DL (ref 8–20)
BUN/CREAT SERPL: 21.3 (ref 10–20)
CALCIUM BLD-MCNC: 9.4 MG/DL (ref 8.3–10.3)
CHLORIDE SERPL-SCNC: 103 MMOL/L (ref 101–111)
CO2 SERPL-SCNC: 27 MMOL/L (ref 22–32)
CREAT BLD-MCNC: 1.64 MG/DL (ref 0.7–1.3)
EST. AVERAGE GLUCOSE BLD GHB EST-MCNC: 192 MG/DL (ref 68–126)
GLOBULIN PLAS-MCNC: 3.7 G/DL (ref 2.8–4.4)
GLUCOSE BLD-MCNC: 175 MG/DL (ref 70–99)
HBA1C MFR BLD HPLC: 8.3 % (ref ?–5.7)
M PROTEIN MFR SERPL ELPH: 7.6 G/DL (ref 6.4–8.2)
OSMOLALITY SERPL CALC.SUM OF ELEC: 294 MOSM/KG (ref 275–295)
POTASSIUM SERPL-SCNC: 5 MMOL/L (ref 3.6–5.1)
SODIUM SERPL-SCNC: 136 MMOL/L (ref 136–144)
T4 FREE SERPL-MCNC: 1 NG/DL (ref 0.9–1.8)
TSI SER-ACNC: 0.04 MIU/ML (ref 0.35–5.5)

## 2018-12-31 PROCEDURE — 80053 COMPREHEN METABOLIC PANEL: CPT

## 2018-12-31 PROCEDURE — 99214 OFFICE O/P EST MOD 30 MIN: CPT | Performed by: INTERNAL MEDICINE

## 2018-12-31 PROCEDURE — 83036 HEMOGLOBIN GLYCOSYLATED A1C: CPT

## 2018-12-31 PROCEDURE — 84443 ASSAY THYROID STIM HORMONE: CPT

## 2018-12-31 PROCEDURE — 84439 ASSAY OF FREE THYROXINE: CPT

## 2018-12-31 RX ORDER — GLIPIZIDE 10 MG/1
10 TABLET, FILM COATED, EXTENDED RELEASE ORAL
Qty: 90 TABLET | Refills: 1 | Status: CANCELLED | OUTPATIENT
Start: 2018-12-31

## 2018-12-31 RX ORDER — LANCETS
1 EACH MISCELLANEOUS DAILY
Qty: 300 EACH | Refills: 3 | Status: CANCELLED | OUTPATIENT
Start: 2018-12-31

## 2018-12-31 NOTE — PROGRESS NOTES
Sabrina Florence is a 63year old male. Patient presents with:  Diabetes: LG. Room 3. No glucose readings.  Due for eye exam. Doesn't want flu vaccine   High Cholesterol  Skin      HPI:     DM2 - -225, not checking regularly, says he eats healthy skin lesions or rashes  RESPIRATORY: +smokers cough  CARDIOVASCULAR: denies chest pain  GI: denies abdominal pain  : no dysuria  NEURO: denies headaches  PSYCH: No reported depression or anxiety  RHEUM: No reported joint swelling  HEME: No adenopathy

## 2019-01-09 ENCOUNTER — LAB ENCOUNTER (OUTPATIENT)
Dept: LAB | Facility: HOSPITAL | Age: 59
End: 2019-01-09
Attending: INTERNAL MEDICINE
Payer: COMMERCIAL

## 2019-01-09 DIAGNOSIS — Z12.11 SCREEN FOR COLON CANCER: ICD-10-CM

## 2019-01-09 PROCEDURE — 82274 ASSAY TEST FOR BLOOD FECAL: CPT

## 2019-01-09 PROCEDURE — 82272 OCCULT BLD FECES 1-3 TESTS: CPT

## 2019-01-26 DIAGNOSIS — IMO0001 UNCONTROLLED TYPE 2 DIABETES MELLITUS WITHOUT COMPLICATION, WITHOUT LONG-TERM CURRENT USE OF INSULIN: ICD-10-CM

## 2019-01-28 RX ORDER — GLIPIZIDE 10 MG/1
TABLET, FILM COATED, EXTENDED RELEASE ORAL
Qty: 90 TABLET | Refills: 1 | Status: SHIPPED | OUTPATIENT
Start: 2019-01-28 | End: 2019-05-06

## 2019-02-01 NOTE — TELEPHONE ENCOUNTER
Future Appointments   Date Time Provider Ramon Peters   3/30/2019  9:15 AM Neno Trejo MD [facility]     For DM FU

## 2019-03-07 ENCOUNTER — TELEPHONE (OUTPATIENT)
Dept: INTERNAL MEDICINE CLINIC | Facility: CLINIC | Age: 59
End: 2019-03-07

## 2019-03-07 DIAGNOSIS — R94.6 BORDERLINE ABNORMAL TFTS: ICD-10-CM

## 2019-03-07 DIAGNOSIS — E78.2 MIXED HYPERLIPIDEMIA: Primary | ICD-10-CM

## 2019-03-07 DIAGNOSIS — IMO0001 UNCONTROLLED TYPE 2 DIABETES MELLITUS WITHOUT COMPLICATION, WITHOUT LONG-TERM CURRENT USE OF INSULIN: ICD-10-CM

## 2019-03-07 RX ORDER — ATORVASTATIN CALCIUM 10 MG/1
10 TABLET, FILM COATED ORAL NIGHTLY
Qty: 30 TABLET | Refills: 3 | Status: SHIPPED | OUTPATIENT
Start: 2019-03-07 | End: 2019-03-21 | Stop reason: ALTCHOICE

## 2019-03-07 NOTE — TELEPHONE ENCOUNTER
Called patient to inform him of the change patient understood however he also states that  His Glipizide and Lisinopril are a little pricey and would to know if there are alternative for that, patient states he has an appointment on 3/30 and would like to

## 2019-03-07 NOTE — TELEPHONE ENCOUNTER
Prescription Refill Request - Patient advised can take 48-72 hours. Name of Medication (strength, dose, qty requested:     ROSUVASTATIN CALCIUM 5 MG Oral Tab 30 tablet 3 10/1/2018    Sig :  TAKE 1 TABLET (5 MG TOTAL) BY MOUTH NIGHTLY.        Which [employer]

## 2019-03-07 NOTE — TELEPHONE ENCOUNTER
Unfortunately no cheaper medication than glipizide or lisinopril, can talk to him further in [address] should be after in April, too soon to do in March (insurance may not cover)

## 2019-03-07 NOTE — TELEPHONE ENCOUNTER
This is the generic one already. Can change to atorvastatin 10mg which would be equal to 5 of rosuvastatin. I will send to pharmacy and then due to change, we will need to check LFTs and lipids. I will order rxn and labs. Please let him know.  I am guessin

## 2019-03-21 ENCOUNTER — TELEPHONE (OUTPATIENT)
Dept: INTERNAL MEDICINE CLINIC | Facility: CLINIC | Age: 59
End: 2019-03-21

## 2019-03-21 DIAGNOSIS — E78.2 MIXED HYPERLIPIDEMIA: ICD-10-CM

## 2019-03-21 RX ORDER — ROSUVASTATIN CALCIUM 5 MG/1
5 TABLET, COATED ORAL NIGHTLY
Qty: 90 TABLET | Refills: 0 | Status: SHIPPED | OUTPATIENT
Start: 2019-03-21 | End: 2019-11-23

## 2019-03-21 NOTE — TELEPHONE ENCOUNTER
LOV 12/31/18  FOV 3/30/19    rx Rosuvastatin Calcium 5 MG pended for your approval if ok. Please advise. Thank you.

## 2019-03-21 NOTE — TELEPHONE ENCOUNTER
[id number] Karin Dorsey, script signed.  Makes sense to switch back to rosuvastatin due to Headaches with atorvastatin

## 2019-03-21 NOTE — TELEPHONE ENCOUNTER
Called pt to inform, per TB, ok to switch back to rosuvastatin due to headaches with atorvastatin- sent to #[address] to [address] listed as requested. Pt verbalized understanding and agreed with POC.

## 2019-03-21 NOTE — TELEPHONE ENCOUNTER
Re ROSUVASTATIN CALCIUM 5 MG. Pt would like to go back to taking this. Would like script written for 90 days sent to [address] [facility]. The Atrovastin was giving him headaches after taking it. Pt is out of ROSUVASTATIN and does not want to take the other.

## 2019-03-30 ENCOUNTER — OFFICE VISIT (OUTPATIENT)
Dept: INTERNAL MEDICINE CLINIC | Facility: CLINIC | Age: 59
End: 2019-03-30
Payer: COMMERCIAL

## 2019-03-30 VITALS
SYSTOLIC BLOOD PRESSURE: 124 MMHG | HEIGHT: 61 IN | RESPIRATION RATE: 16 BRPM | BODY MASS INDEX: 30.02 KG/M2 | DIASTOLIC BLOOD PRESSURE: 76 MMHG | HEART RATE: 76 BPM | WEIGHT: 159 LBS

## 2019-03-30 DIAGNOSIS — IMO0001 UNCONTROLLED TYPE 2 DIABETES MELLITUS WITHOUT COMPLICATION, WITHOUT LONG-TERM CURRENT USE OF INSULIN: Primary | ICD-10-CM

## 2019-03-30 DIAGNOSIS — I10 ESSENTIAL HYPERTENSION: ICD-10-CM

## 2019-03-30 DIAGNOSIS — E78.5 HYPERLIPIDEMIA LDL GOAL <100: ICD-10-CM

## 2019-03-30 LAB
GLUCOSE BLOOD: 150
TEST STRIP LOT #: NORMAL NUMERIC

## 2019-03-30 PROCEDURE — 99214 OFFICE O/P EST MOD 30 MIN: CPT | Performed by: INTERNAL MEDICINE

## 2019-03-30 PROCEDURE — 82962 GLUCOSE BLOOD TEST: CPT | Performed by: INTERNAL MEDICINE

## 2019-03-30 NOTE — PROGRESS NOTES
Colby Velázquez is a 63year old male. Patient presents with:  Diabetes: follow up. LB-rm 4      HPI:     Patient here for f/u-  DM2- had to change to [employer] meter from [employer] due to cost of strips. BG variable, 80s-300 one time.  Believes the high is dysuria  NEURO: denies headaches off the lipitor  PSYCH: No reported depression or anxiety  RHEUM: No reported joint swelling  HEME: No adenopathy      EXAM:   /76 (BP Location: Right arm, Patient Position: Sitting, Cuff Size: adult)   Pulse 76   Res

## 2019-04-09 RX ORDER — LISINOPRIL AND HYDROCHLOROTHIAZIDE 20; 12.5 MG/1; MG/1
TABLET ORAL
Qty: 90 TABLET | Refills: 0 | OUTPATIENT
Start: 2019-04-09

## 2019-04-17 RX ORDER — LISINOPRIL AND HYDROCHLOROTHIAZIDE 20; 12.5 MG/1; MG/1
1 TABLET ORAL
Qty: 90 TABLET | Refills: 1 | Status: SHIPPED | OUTPATIENT
Start: 2019-04-17 | End: 2019-12-03

## 2019-05-06 DIAGNOSIS — IMO0001 UNCONTROLLED TYPE 2 DIABETES MELLITUS WITHOUT COMPLICATION, WITHOUT LONG-TERM CURRENT USE OF INSULIN: ICD-10-CM

## 2019-05-06 RX ORDER — GLIPIZIDE 10 MG/1
10 TABLET, FILM COATED, EXTENDED RELEASE ORAL
Qty: 90 TABLET | Refills: 0 | Status: SHIPPED | OUTPATIENT
Start: 2019-05-06 | End: 2019-07-31

## 2019-05-06 NOTE — TELEPHONE ENCOUNTER
Pt needs refill of medication sent to the [facility] on file   [address] [place], [place] - [address] [facility] [phone], [phone]    GLIPIZIDE ER 10 MG Oral Tablet 24 Hr 90 tablet 1 1/28/2019     Sig: TAKE 1 TABLET BY MOUTH EVERY

## 2019-06-15 ENCOUNTER — APPOINTMENT (OUTPATIENT)
Dept: LAB | Age: 59
End: 2019-06-15
Attending: INTERNAL MEDICINE
Payer: COMMERCIAL

## 2019-06-15 DIAGNOSIS — R94.6 BORDERLINE ABNORMAL TFTS: ICD-10-CM

## 2019-06-15 DIAGNOSIS — IMO0001 UNCONTROLLED TYPE 2 DIABETES MELLITUS WITHOUT COMPLICATION, WITHOUT LONG-TERM CURRENT USE OF INSULIN: ICD-10-CM

## 2019-06-15 DIAGNOSIS — E78.2 MIXED HYPERLIPIDEMIA: ICD-10-CM

## 2019-06-15 PROCEDURE — 82570 ASSAY OF URINE CREATININE: CPT

## 2019-06-15 PROCEDURE — 83036 HEMOGLOBIN GLYCOSYLATED A1C: CPT

## 2019-06-15 PROCEDURE — 80061 LIPID PANEL: CPT

## 2019-06-15 PROCEDURE — 82043 UR ALBUMIN QUANTITATIVE: CPT

## 2019-06-15 PROCEDURE — 84443 ASSAY THYROID STIM HORMONE: CPT

## 2019-06-15 PROCEDURE — 84439 ASSAY OF FREE THYROXINE: CPT

## 2019-06-15 PROCEDURE — 80053 COMPREHEN METABOLIC PANEL: CPT

## 2019-07-31 DIAGNOSIS — IMO0001 UNCONTROLLED TYPE 2 DIABETES MELLITUS WITHOUT COMPLICATION, WITHOUT LONG-TERM CURRENT USE OF INSULIN: ICD-10-CM

## 2019-07-31 RX ORDER — GLIPIZIDE 10 MG/1
TABLET, FILM COATED, EXTENDED RELEASE ORAL
Qty: 90 TABLET | Refills: 0 | Status: SHIPPED | OUTPATIENT
Start: 2019-07-31 | End: 2019-11-02

## 2019-07-31 NOTE — TELEPHONE ENCOUNTER
Last Office Visit: 3-30-19 with TB for diabetes  Last Rx Filled: 5-6-19 90 tabs with no refills   Last Labs: 6-15-19 tsh/t4/urine microalbumin/hga1c/lipid/cmp  Future Appointment: 8-21-19    Per protocol to provider

## 2019-08-21 ENCOUNTER — OFFICE VISIT (OUTPATIENT)
Dept: INTERNAL MEDICINE CLINIC | Facility: CLINIC | Age: 59
End: 2019-08-21
Payer: COMMERCIAL

## 2019-08-21 VITALS
WEIGHT: 156.19 LBS | SYSTOLIC BLOOD PRESSURE: 116 MMHG | DIASTOLIC BLOOD PRESSURE: 74 MMHG | HEIGHT: 61 IN | HEART RATE: 76 BPM | RESPIRATION RATE: 18 BRPM | BODY MASS INDEX: 29.49 KG/M2

## 2019-08-21 DIAGNOSIS — E78.2 MIXED HYPERLIPIDEMIA: ICD-10-CM

## 2019-08-21 DIAGNOSIS — I10 ESSENTIAL HYPERTENSION: ICD-10-CM

## 2019-08-21 DIAGNOSIS — R79.89 ABNORMAL TSH: ICD-10-CM

## 2019-08-21 DIAGNOSIS — IMO0001 UNCONTROLLED TYPE 2 DIABETES MELLITUS WITHOUT COMPLICATION, WITHOUT LONG-TERM CURRENT USE OF INSULIN: Primary | ICD-10-CM

## 2019-08-21 PROCEDURE — 99214 OFFICE O/P EST MOD 30 MIN: CPT | Performed by: INTERNAL MEDICINE

## 2019-08-21 NOTE — PROGRESS NOTES
Rajan Mckeon is a 63year old male. Patient presents with: Follow - Up: cn room 3: follow up to lab results       HPI:     Patient here for f/u  DM2-  -160s on glipizide, sometimes BG over 200 if he cheats with sweets.   HGBA1c in June 8.6, D unusual skin lesions or rashes  RESPIRATORY: no cough  CARDIOVASCULAR: denies chest pain  GI: denies abdominal pain  : no dysuria  NEURO: denies headaches  PSYCH: No reported depression or anxiety  RHEUM: No reported joint swelling  HEME: No adenopathy

## 2019-11-02 DIAGNOSIS — IMO0001 UNCONTROLLED TYPE 2 DIABETES MELLITUS WITHOUT COMPLICATION, WITHOUT LONG-TERM CURRENT USE OF INSULIN: ICD-10-CM

## 2019-11-02 NOTE — TELEPHONE ENCOUNTER
Protocol failed due to Last HgBA1C < 7.5    Please advise,    LOV:8/21/19 TB  FOV:none on file   LAST RX:7/31/19 10 mg take 1 tab daily 90 tabs 0 refills   LAST LABS: 6/15/19 cmp,lipids,a1c,micrroalb,tsh  PER PROTOCOL: to provider

## 2019-11-03 RX ORDER — GLIPIZIDE 10 MG/1
TABLET, FILM COATED, EXTENDED RELEASE ORAL
Qty: 90 TABLET | Refills: 0 | Status: SHIPPED | OUTPATIENT
Start: 2019-11-03 | End: 2020-02-08

## 2019-11-19 ENCOUNTER — TELEPHONE (OUTPATIENT)
Dept: INTERNAL MEDICINE CLINIC | Facility: CLINIC | Age: 59
End: 2019-11-19

## 2019-11-19 NOTE — TELEPHONE ENCOUNTER
Due for DM labs, already ordered, NON-FASTING. He should do ASAP. Also, has he done eye exa as referred by TB at last visit? If yes, get record.

## 2019-11-19 NOTE — TELEPHONE ENCOUNTER
LM for pt at [phone] (M)vm to call back to discuss labs to complete asap and if he had eye exam completed, if so where/when.

## 2019-11-23 DIAGNOSIS — E78.2 MIXED HYPERLIPIDEMIA: ICD-10-CM

## 2019-11-25 RX ORDER — ROSUVASTATIN CALCIUM 5 MG/1
TABLET, COATED ORAL
Qty: 90 TABLET | Refills: 0 | Status: SHIPPED | OUTPATIENT
Start: 2019-11-25 | End: 2020-03-16

## 2019-11-29 NOTE — TELEPHONE ENCOUNTER
#2 LM for pt to complete non fasting labs at [facility] and let us know if he had a dilated eye exam(will need to know where and when so we can ask them for the record).

## 2019-12-02 NOTE — TELEPHONE ENCOUNTER
Pt called to say he is unable to schedule appt at this time. Is driving to [place] and will be there until  after the first of the year. Does not  know when he will be back so he did not want to set something up, even for January.      Has not had his labs done

## 2019-12-03 RX ORDER — LISINOPRIL AND HYDROCHLOROTHIAZIDE 20; 12.5 MG/1; MG/1
1 TABLET ORAL
Qty: 90 TABLET | Refills: 0 | Status: SHIPPED | OUTPATIENT
Start: 2019-12-03 | End: 2020-03-18

## 2019-12-03 NOTE — TELEPHONE ENCOUNTER
Pt called back to say he will be going out of town through the beginning of the year. Will call back as soon as he returned, toward the middle of January.

## 2019-12-03 NOTE — TELEPHONE ENCOUNTER
Per Protocol, passed. Refill sent.     FWD to [facility], please assist pt in scheduling physical. Last done 3/2018

## 2020-02-08 RX ORDER — GLIPIZIDE 10 MG/1
TABLET, FILM COATED, EXTENDED RELEASE ORAL
Qty: 30 TABLET | Refills: 0 | Status: SHIPPED | OUTPATIENT
Start: 2020-02-08 | End: 2020-03-16

## 2020-02-08 NOTE — TELEPHONE ENCOUNTER
Last Ov: 8/21/19 TB, F/U  Upcoming appt: no upcoming appt   Last labs: TSH + Free T4, Microalb/creat, A1c, Lipid, CMP 6/15/19  Last Rx: glipizide ER 10mg, #90, 0R 11/3/19    Per Protocol, failed. Pt due for A1c and last A1c out of range. Rx pending.

## 2020-02-11 NOTE — TELEPHONE ENCOUNTER
Pt called today stating he is still in [address] until next week and will call us back then to sched appt

## 2020-03-06 ENCOUNTER — TELEPHONE (OUTPATIENT)
Dept: INTERNAL MEDICINE CLINIC | Facility: CLINIC | Age: 60
End: 2020-03-06

## 2020-03-06 DIAGNOSIS — Z13.29 SCREENING FOR THYROID DISORDER: ICD-10-CM

## 2020-03-06 DIAGNOSIS — Z13.228 SCREENING FOR METABOLIC DISORDER: ICD-10-CM

## 2020-03-06 DIAGNOSIS — Z13.220 SCREENING FOR LIPID DISORDERS: ICD-10-CM

## 2020-03-06 DIAGNOSIS — Z00.00 ROUTINE GENERAL MEDICAL EXAMINATION AT A HEALTH CARE FACILITY: Primary | ICD-10-CM

## 2020-03-06 DIAGNOSIS — E11.649 UNCONTROLLED TYPE 2 DIABETES MELLITUS WITH HYPOGLYCEMIA WITHOUT COMA (HCC): ICD-10-CM

## 2020-03-06 DIAGNOSIS — Z13.0 SCREENING FOR BLOOD DISEASE: ICD-10-CM

## 2020-03-06 NOTE — TELEPHONE ENCOUNTER
Future Appointments   Date Time Provider Ramon Peters   4/17/2020 10:20 AM Laxmi Hastings MD [facility]     Orders to edward- Pt informed that labs need to be completed no sooner than 2 weeks prior to the appt.  Pt aware to fast-no call back

## 2020-03-16 DIAGNOSIS — E78.2 MIXED HYPERLIPIDEMIA: ICD-10-CM

## 2020-03-16 RX ORDER — GLIPIZIDE 10 MG/1
TABLET, FILM COATED, EXTENDED RELEASE ORAL
Qty: 90 TABLET | Refills: 0 | Status: SHIPPED | OUTPATIENT
Start: 2020-03-16 | End: 2020-07-07

## 2020-03-16 RX ORDER — ROSUVASTATIN CALCIUM 5 MG/1
TABLET, COATED ORAL
Qty: 90 TABLET | Refills: 0 | Status: SHIPPED | OUTPATIENT
Start: 2020-03-16 | End: 2021-02-27

## 2020-03-16 NOTE — TELEPHONE ENCOUNTER
Last Ov: 8/21/19, TB, F/U  Upcoming appt: 4/17/20, TB  Last labs: TSH+Free T4, Microalb/creat, A1c, Lipid, CMP 6/15/19  Last Rx: glipizide ER 10mg, #30, 0R, 2/8/20    Per Protocol, glipizide failed. Pt due for A1c and last A1c out of range. Rx pending.

## 2020-03-18 RX ORDER — LISINOPRIL AND HYDROCHLOROTHIAZIDE 20; 12.5 MG/1; MG/1
1 TABLET ORAL
Qty: 90 TABLET | Refills: 0 | Status: SHIPPED | OUTPATIENT
Start: 2020-03-18 | End: 2020-06-16

## 2020-03-18 NOTE — TELEPHONE ENCOUNTER
Pt stated he would like a refill on the below medication. Pt stated he has an appt coming up soon but only has 5 left of this medication. Please advise.     LISINOPRIL-HYDROCHLOROTHIAZIDE 20-12.5 MG Oral Tab 90 tablet 0 12/3/2019

## 2020-04-07 NOTE — TELEPHONE ENCOUNTER
Future Appointments   Date Time Provider Ramon Lorraine   5/29/2020  9:20 AM Yannick Maurer MD [facility]     For CPE

## 2020-06-16 RX ORDER — LISINOPRIL AND HYDROCHLOROTHIAZIDE 20; 12.5 MG/1; MG/1
1 TABLET ORAL
Qty: 30 TABLET | Refills: 0 | Status: SHIPPED | OUTPATIENT
Start: 2020-06-16 | End: 2020-08-11

## 2020-06-16 NOTE — TELEPHONE ENCOUNTER
Last Ov: 8/21/19, TB, F/U  Last labs: TSH + Free T4, Microalb/creat, A1c, Lipid, CMP 6/15/19  Last Rx: lisinopril-HCTZ 20/12.5mg, #90, 0R 3/18/20    No future appointments. Per Protocol - failed. Pt due for CMP/BMP and appt. Rx pending.

## 2020-07-07 RX ORDER — GLIPIZIDE 10 MG/1
TABLET, FILM COATED, EXTENDED RELEASE ORAL
Qty: 30 TABLET | Refills: 0 | Status: SHIPPED | OUTPATIENT
Start: 2020-07-07 | End: 2020-08-11

## 2020-07-07 NOTE — TELEPHONE ENCOUNTER
Last Ov: 8/21/19, TB, F/U (lab results)  Last labs: TSH+Free T4, microalb/creat, A1c, Lipid, CMP 6/16/19  Last Rx: glipizide ER 10mg, #90, 0R 3/16/20    No future appointments. Per Protocol - failed. Pt due for appt, microalb/creat, A1c, and last A1c out of range. Rx pending.

## 2020-07-18 ENCOUNTER — LAB ENCOUNTER (OUTPATIENT)
Dept: LAB | Facility: HOSPITAL | Age: 60
End: 2020-07-18
Attending: INTERNAL MEDICINE
Payer: COMMERCIAL

## 2020-07-18 DIAGNOSIS — Z13.228 SCREENING FOR METABOLIC DISORDER: ICD-10-CM

## 2020-07-18 DIAGNOSIS — Z00.00 ROUTINE GENERAL MEDICAL EXAMINATION AT A HEALTH CARE FACILITY: ICD-10-CM

## 2020-07-18 DIAGNOSIS — Z13.0 SCREENING FOR BLOOD DISEASE: ICD-10-CM

## 2020-07-18 DIAGNOSIS — Z13.29 SCREENING FOR THYROID DISORDER: ICD-10-CM

## 2020-07-18 DIAGNOSIS — Z13.220 SCREENING FOR LIPID DISORDERS: ICD-10-CM

## 2020-07-18 DIAGNOSIS — E11.649 UNCONTROLLED TYPE 2 DIABETES MELLITUS WITH HYPOGLYCEMIA WITHOUT COMA (HCC): ICD-10-CM

## 2020-07-18 LAB
ALBUMIN SERPL-MCNC: 3.8 G/DL (ref 3.4–5)
ALBUMIN/GLOB SERPL: 1 {RATIO} (ref 1–2)
ALP LIVER SERPL-CCNC: 78 U/L (ref 45–117)
ALT SERPL-CCNC: 26 U/L (ref 16–61)
ANION GAP SERPL CALC-SCNC: 3 MMOL/L (ref 0–18)
AST SERPL-CCNC: 14 U/L (ref 15–37)
BASOPHILS # BLD AUTO: 0.06 X10(3) UL (ref 0–0.2)
BASOPHILS NFR BLD AUTO: 0.8 %
BILIRUB SERPL-MCNC: 0.4 MG/DL (ref 0.1–2)
BUN BLD-MCNC: 40 MG/DL (ref 7–18)
BUN/CREAT SERPL: 24.8 (ref 10–20)
CALCIUM BLD-MCNC: 9.8 MG/DL (ref 8.5–10.1)
CHLORIDE SERPL-SCNC: 109 MMOL/L (ref 98–112)
CHOLEST SMN-MCNC: 150 MG/DL (ref ?–200)
CO2 SERPL-SCNC: 26 MMOL/L (ref 21–32)
CREAT BLD-MCNC: 1.61 MG/DL (ref 0.7–1.3)
CREAT UR-SCNC: 67.2 MG/DL
DEPRECATED RDW RBC AUTO: 42.6 FL (ref 35.1–46.3)
EOSINOPHIL # BLD AUTO: 0.36 X10(3) UL (ref 0–0.7)
EOSINOPHIL NFR BLD AUTO: 4.9 %
ERYTHROCYTE [DISTWIDTH] IN BLOOD BY AUTOMATED COUNT: 12.9 % (ref 11–15)
EST. AVERAGE GLUCOSE BLD GHB EST-MCNC: 143 MG/DL (ref 68–126)
GLOBULIN PLAS-MCNC: 3.9 G/DL (ref 2.8–4.4)
GLUCOSE BLD-MCNC: 122 MG/DL (ref 70–99)
HBA1C MFR BLD HPLC: 6.6 % (ref ?–5.7)
HCT VFR BLD AUTO: 44.1 % (ref 39–53)
HDLC SERPL-MCNC: 35 MG/DL (ref 40–59)
HGB BLD-MCNC: 14.2 G/DL (ref 13–17.5)
IMM GRANULOCYTES # BLD AUTO: 0.04 X10(3) UL (ref 0–1)
IMM GRANULOCYTES NFR BLD: 0.5 %
LDLC SERPL CALC-MCNC: 80 MG/DL (ref ?–100)
LYMPHOCYTES # BLD AUTO: 1.71 X10(3) UL (ref 1–4)
LYMPHOCYTES NFR BLD AUTO: 23 %
M PROTEIN MFR SERPL ELPH: 7.7 G/DL (ref 6.4–8.2)
MCH RBC QN AUTO: 29.5 PG (ref 26–34)
MCHC RBC AUTO-ENTMCNC: 32.2 G/DL (ref 31–37)
MCV RBC AUTO: 91.5 FL (ref 80–100)
MICROALBUMIN UR-MCNC: 1.21 MG/DL
MICROALBUMIN/CREAT 24H UR-RTO: 18 UG/MG (ref ?–30)
MONOCYTES # BLD AUTO: 0.52 X10(3) UL (ref 0.1–1)
MONOCYTES NFR BLD AUTO: 7 %
NEUTROPHILS # BLD AUTO: 4.73 X10 (3) UL (ref 1.5–7.7)
NEUTROPHILS # BLD AUTO: 4.73 X10(3) UL (ref 1.5–7.7)
NEUTROPHILS NFR BLD AUTO: 63.8 %
NONHDLC SERPL-MCNC: 115 MG/DL (ref ?–130)
OSMOLALITY SERPL CALC.SUM OF ELEC: 297 MOSM/KG (ref 275–295)
PATIENT FASTING Y/N/NP: YES
PATIENT FASTING Y/N/NP: YES
PLATELET # BLD AUTO: 217 10(3)UL (ref 150–450)
POTASSIUM SERPL-SCNC: 4.5 MMOL/L (ref 3.5–5.1)
RBC # BLD AUTO: 4.82 X10(6)UL (ref 4.3–5.7)
SODIUM SERPL-SCNC: 138 MMOL/L (ref 136–145)
T4 FREE SERPL-MCNC: 1 NG/DL (ref 0.8–1.7)
TRIGL SERPL-MCNC: 174 MG/DL (ref 30–149)
TSI SER-ACNC: 0.01 MIU/ML (ref 0.36–3.74)
VLDLC SERPL CALC-MCNC: 35 MG/DL (ref 0–30)
WBC # BLD AUTO: 7.4 X10(3) UL (ref 4–11)

## 2020-07-18 PROCEDURE — 80053 COMPREHEN METABOLIC PANEL: CPT

## 2020-07-18 PROCEDURE — 80061 LIPID PANEL: CPT

## 2020-07-18 PROCEDURE — 85025 COMPLETE CBC W/AUTO DIFF WBC: CPT

## 2020-07-18 PROCEDURE — 36415 COLL VENOUS BLD VENIPUNCTURE: CPT

## 2020-07-18 PROCEDURE — 84443 ASSAY THYROID STIM HORMONE: CPT

## 2020-07-18 PROCEDURE — 82570 ASSAY OF URINE CREATININE: CPT

## 2020-07-18 PROCEDURE — 84439 ASSAY OF FREE THYROXINE: CPT

## 2020-07-18 PROCEDURE — 83036 HEMOGLOBIN GLYCOSYLATED A1C: CPT

## 2020-07-18 PROCEDURE — 82043 UR ALBUMIN QUANTITATIVE: CPT

## 2020-07-27 ENCOUNTER — OFFICE VISIT (OUTPATIENT)
Dept: INTERNAL MEDICINE CLINIC | Facility: CLINIC | Age: 60
End: 2020-07-27
Payer: COMMERCIAL

## 2020-07-27 VITALS
RESPIRATION RATE: 16 BRPM | SYSTOLIC BLOOD PRESSURE: 122 MMHG | WEIGHT: 149 LBS | DIASTOLIC BLOOD PRESSURE: 60 MMHG | HEART RATE: 64 BPM | BODY MASS INDEX: 27.07 KG/M2 | TEMPERATURE: 98 F | HEIGHT: 62.4 IN | OXYGEN SATURATION: 98 %

## 2020-07-27 DIAGNOSIS — N18.30 CKD (CHRONIC KIDNEY DISEASE) STAGE 3, GFR 30-59 ML/MIN (HCC): ICD-10-CM

## 2020-07-27 DIAGNOSIS — N18.30 TYPE 2 DIABETES MELLITUS WITH STAGE 3 CHRONIC KIDNEY DISEASE, WITHOUT LONG-TERM CURRENT USE OF INSULIN (HCC): Primary | ICD-10-CM

## 2020-07-27 DIAGNOSIS — Z12.11 SCREEN FOR COLON CANCER: ICD-10-CM

## 2020-07-27 DIAGNOSIS — I10 ESSENTIAL HYPERTENSION: ICD-10-CM

## 2020-07-27 DIAGNOSIS — E05.90 SUBCLINICAL HYPERTHYROIDISM: ICD-10-CM

## 2020-07-27 DIAGNOSIS — E78.5 HYPERLIPIDEMIA LDL GOAL <100: ICD-10-CM

## 2020-07-27 DIAGNOSIS — Z12.5 ENCOUNTER FOR SPECIAL SCREENING EXAMINATION FOR NEOPLASM OF PROSTATE: ICD-10-CM

## 2020-07-27 DIAGNOSIS — E11.22 TYPE 2 DIABETES MELLITUS WITH STAGE 3 CHRONIC KIDNEY DISEASE, WITHOUT LONG-TERM CURRENT USE OF INSULIN (HCC): Primary | ICD-10-CM

## 2020-07-27 PROCEDURE — 3074F SYST BP LT 130 MM HG: CPT | Performed by: INTERNAL MEDICINE

## 2020-07-27 PROCEDURE — 99214 OFFICE O/P EST MOD 30 MIN: CPT | Performed by: INTERNAL MEDICINE

## 2020-07-27 PROCEDURE — 3078F DIAST BP <80 MM HG: CPT | Performed by: INTERNAL MEDICINE

## 2020-07-27 PROCEDURE — 3008F BODY MASS INDEX DOCD: CPT | Performed by: INTERNAL MEDICINE

## 2020-07-27 NOTE — PROGRESS NOTES
Nico Thakur is a 63year old male. Patient presents with:   Follow - Up: RG rm 4 f/u diabetes and check in      HPI:     Patient here for f/u-  DM2- eating well, intentionally lost weight, +CKD3 with stable labs, due for eye exam and he will likely headaches  HEME: No adenopathy      EXAM:   /60   Pulse 64   Temp 97.5 °F (36.4 °C)   Resp 16   Ht 62.4\"   Wt 149 lb (67.6 kg)   SpO2 98%   BMI 26.90 kg/m²   GENERAL: well developed, well nourished,in no apparent distress  SKIN: no rashes,no suspici and agrees to the plan.

## 2020-08-11 RX ORDER — LISINOPRIL AND HYDROCHLOROTHIAZIDE 20; 12.5 MG/1; MG/1
1 TABLET ORAL
Qty: 30 TABLET | Refills: 0 | Status: SHIPPED | OUTPATIENT
Start: 2020-08-11 | End: 2020-09-23

## 2020-08-11 RX ORDER — GLIPIZIDE 10 MG/1
TABLET, FILM COATED, EXTENDED RELEASE ORAL
Qty: 90 TABLET | Refills: 0 | Status: SHIPPED | OUTPATIENT
Start: 2020-08-11 | End: 2020-11-02

## 2020-08-11 RX ORDER — LISINOPRIL AND HYDROCHLOROTHIAZIDE 20; 12.5 MG/1; MG/1
1 TABLET ORAL
Qty: 30 TABLET | Refills: 0 | Status: CANCELLED | OUTPATIENT
Start: 2020-08-11

## 2020-08-11 NOTE — TELEPHONE ENCOUNTER
Last VISIT 07/27/20    Last REFILL 07/07/20 qty 30 w/0 refills    Last LABS 07/18/20 CBC, CMP, Lipid, TSH, A1c, Microalb/creat done    No future appointments. Per PROTOCOL? Passed    Please Approve or Deny.

## 2020-09-23 RX ORDER — LISINOPRIL AND HYDROCHLOROTHIAZIDE 20; 12.5 MG/1; MG/1
1 TABLET ORAL
Qty: 30 TABLET | Refills: 0 | Status: SHIPPED | OUTPATIENT
Start: 2020-09-23 | End: 2020-11-02

## 2020-09-23 NOTE — TELEPHONE ENCOUNTER
Latest RX: Lisinopril-hydroCHLOROthiazide 20-12.5 MG Oral Tablet 30 tabs 0 refills on 8/11/20    Per protocol, passed. Rx sent.

## 2020-11-02 RX ORDER — LISINOPRIL AND HYDROCHLOROTHIAZIDE 20; 12.5 MG/1; MG/1
1 TABLET ORAL
Qty: 30 TABLET | Refills: 0 | Status: SHIPPED | OUTPATIENT
Start: 2020-11-02 | End: 2021-01-04

## 2020-11-02 RX ORDER — GLIPIZIDE 10 MG/1
TABLET, FILM COATED, EXTENDED RELEASE ORAL
Qty: 30 TABLET | Refills: 0 | Status: SHIPPED | OUTPATIENT
Start: 2020-11-02 | End: 2021-01-27

## 2021-01-04 RX ORDER — LISINOPRIL AND HYDROCHLOROTHIAZIDE 20; 12.5 MG/1; MG/1
1 TABLET ORAL
Qty: 30 TABLET | Refills: 0 | Status: SHIPPED | OUTPATIENT
Start: 2021-01-04 | End: 2021-02-25

## 2021-01-27 RX ORDER — GLIPIZIDE 10 MG/1
TABLET, FILM COATED, EXTENDED RELEASE ORAL
Qty: 30 TABLET | Refills: 0 | Status: SHIPPED | OUTPATIENT
Start: 2021-01-27 | End: 2021-02-27

## 2021-01-27 NOTE — TELEPHONE ENCOUNTER
Refill sent, please schedule f/u within a month  Can do virtual if he absolutely cannot come in.  I have Wed virtuals

## 2021-01-27 NOTE — TELEPHONE ENCOUNTER
Last OV: 7/27/20 DM f/u    No future appointments.      Latest labs: 7/18/20 Microalb, A1c, TSH, Lipid, CMP and CBC    Latest RX: Glipizide- 30 tabs 0 refills on 11/2/20    Per protocol, failed due to HgBA1C procedure resulted in past 6 months, Last HgBA1C

## 2021-02-01 ENCOUNTER — TELEPHONE (OUTPATIENT)
Dept: INTERNAL MEDICINE CLINIC | Facility: CLINIC | Age: 61
End: 2021-02-01

## 2021-02-01 DIAGNOSIS — Z13.220 SCREENING FOR LIPID DISORDERS: ICD-10-CM

## 2021-02-01 DIAGNOSIS — E11.649 UNCONTROLLED TYPE 2 DIABETES MELLITUS WITH HYPOGLYCEMIA WITHOUT COMA (HCC): ICD-10-CM

## 2021-02-01 DIAGNOSIS — Z13.29 SCREENING FOR THYROID DISORDER: ICD-10-CM

## 2021-02-01 DIAGNOSIS — Z12.11 SCREEN FOR COLON CANCER: ICD-10-CM

## 2021-02-01 DIAGNOSIS — Z00.00 ROUTINE GENERAL MEDICAL EXAMINATION AT A HEALTH CARE FACILITY: Primary | ICD-10-CM

## 2021-02-01 DIAGNOSIS — Z13.0 SCREENING FOR BLOOD DISEASE: ICD-10-CM

## 2021-02-01 DIAGNOSIS — Z13.228 SCREENING FOR METABOLIC DISORDER: ICD-10-CM

## 2021-02-01 NOTE — TELEPHONE ENCOUNTER
Orders to [facility]. Pt aware to get labs done no sooner than 2 weeks prior to the appt. Pt aware to fast.  No call back required.     Future Appointments   Date Time Provider Ramon Peters   2/27/2021 11:40 AM Abner Gifford MD [facility]

## 2021-02-09 NOTE — TELEPHONE ENCOUNTER
Future Appointments   Date Time Provider Ramon Lorraine   2/27/2021 11:40 AM Stephan Herring MD [facility]

## 2021-02-20 ENCOUNTER — LAB ENCOUNTER (OUTPATIENT)
Dept: LAB | Facility: HOSPITAL | Age: 61
End: 2021-02-20
Attending: INTERNAL MEDICINE
Payer: COMMERCIAL

## 2021-02-20 DIAGNOSIS — Z12.11 SCREEN FOR COLON CANCER: ICD-10-CM

## 2021-02-20 DIAGNOSIS — Z13.29 SCREENING FOR THYROID DISORDER: ICD-10-CM

## 2021-02-20 DIAGNOSIS — E05.90 SUBCLINICAL HYPERTHYROIDISM: ICD-10-CM

## 2021-02-20 DIAGNOSIS — Z13.228 SCREENING FOR METABOLIC DISORDER: ICD-10-CM

## 2021-02-20 DIAGNOSIS — Z13.220 SCREENING FOR LIPID DISORDERS: ICD-10-CM

## 2021-02-20 DIAGNOSIS — Z13.0 SCREENING FOR BLOOD DISEASE: ICD-10-CM

## 2021-02-20 DIAGNOSIS — Z00.00 ROUTINE GENERAL MEDICAL EXAMINATION AT A HEALTH CARE FACILITY: ICD-10-CM

## 2021-02-20 DIAGNOSIS — Z12.5 ENCOUNTER FOR SPECIAL SCREENING EXAMINATION FOR NEOPLASM OF PROSTATE: ICD-10-CM

## 2021-02-20 DIAGNOSIS — E11.649 UNCONTROLLED TYPE 2 DIABETES MELLITUS WITH HYPOGLYCEMIA WITHOUT COMA (HCC): ICD-10-CM

## 2021-02-20 DIAGNOSIS — E78.5 HYPERLIPIDEMIA LDL GOAL <100: ICD-10-CM

## 2021-02-20 DIAGNOSIS — E11.22 TYPE 2 DIABETES MELLITUS WITH STAGE 3 CHRONIC KIDNEY DISEASE, WITHOUT LONG-TERM CURRENT USE OF INSULIN (HCC): ICD-10-CM

## 2021-02-20 DIAGNOSIS — N18.30 CKD (CHRONIC KIDNEY DISEASE) STAGE 3, GFR 30-59 ML/MIN (HCC): ICD-10-CM

## 2021-02-20 DIAGNOSIS — I10 ESSENTIAL HYPERTENSION: ICD-10-CM

## 2021-02-20 DIAGNOSIS — N18.30 TYPE 2 DIABETES MELLITUS WITH STAGE 3 CHRONIC KIDNEY DISEASE, WITHOUT LONG-TERM CURRENT USE OF INSULIN (HCC): ICD-10-CM

## 2021-02-20 LAB
ALBUMIN SERPL-MCNC: 3.7 G/DL (ref 3.4–5)
ALBUMIN/GLOB SERPL: 1 {RATIO} (ref 1–2)
ALP LIVER SERPL-CCNC: 87 U/L
ALT SERPL-CCNC: 43 U/L
ANION GAP SERPL CALC-SCNC: 3 MMOL/L (ref 0–18)
AST SERPL-CCNC: 21 U/L (ref 15–37)
BASOPHILS # BLD AUTO: 0.05 X10(3) UL (ref 0–0.2)
BASOPHILS NFR BLD AUTO: 0.7 %
BILIRUB SERPL-MCNC: 0.6 MG/DL (ref 0.1–2)
BUN BLD-MCNC: 35 MG/DL (ref 7–18)
BUN/CREAT SERPL: 22.9 (ref 10–20)
CALCIUM BLD-MCNC: 8.9 MG/DL (ref 8.5–10.1)
CHLORIDE SERPL-SCNC: 109 MMOL/L (ref 98–112)
CHOLEST SMN-MCNC: 150 MG/DL (ref ?–200)
CO2 SERPL-SCNC: 26 MMOL/L (ref 21–32)
COMPLEXED PSA SERPL-MCNC: 0.53 NG/ML (ref ?–4)
CREAT BLD-MCNC: 1.53 MG/DL
DEPRECATED RDW RBC AUTO: 41.4 FL (ref 35.1–46.3)
EOSINOPHIL # BLD AUTO: 0.35 X10(3) UL (ref 0–0.7)
EOSINOPHIL NFR BLD AUTO: 4.6 %
ERYTHROCYTE [DISTWIDTH] IN BLOOD BY AUTOMATED COUNT: 12.7 % (ref 11–15)
EST. AVERAGE GLUCOSE BLD GHB EST-MCNC: 197 MG/DL (ref 68–126)
GLOBULIN PLAS-MCNC: 3.6 G/DL (ref 2.8–4.4)
GLUCOSE BLD-MCNC: 175 MG/DL (ref 70–99)
HBA1C MFR BLD HPLC: 8.5 % (ref ?–5.7)
HCT VFR BLD AUTO: 45.9 %
HDLC SERPL-MCNC: 37 MG/DL (ref 40–59)
HGB BLD-MCNC: 15.5 G/DL
IMM GRANULOCYTES # BLD AUTO: 0.07 X10(3) UL (ref 0–1)
IMM GRANULOCYTES NFR BLD: 0.9 %
LDLC SERPL CALC-MCNC: 71 MG/DL (ref ?–100)
LYMPHOCYTES # BLD AUTO: 1.83 X10(3) UL (ref 1–4)
LYMPHOCYTES NFR BLD AUTO: 24 %
M PROTEIN MFR SERPL ELPH: 7.3 G/DL (ref 6.4–8.2)
MCH RBC QN AUTO: 30.4 PG (ref 26–34)
MCHC RBC AUTO-ENTMCNC: 33.8 G/DL (ref 31–37)
MCV RBC AUTO: 90 FL
MONOCYTES # BLD AUTO: 0.54 X10(3) UL (ref 0.1–1)
MONOCYTES NFR BLD AUTO: 7.1 %
NEUTROPHILS # BLD AUTO: 4.8 X10 (3) UL (ref 1.5–7.7)
NEUTROPHILS # BLD AUTO: 4.8 X10(3) UL (ref 1.5–7.7)
NEUTROPHILS NFR BLD AUTO: 62.7 %
NONHDLC SERPL-MCNC: 113 MG/DL (ref ?–130)
OSMOLALITY SERPL CALC.SUM OF ELEC: 298 MOSM/KG (ref 275–295)
PATIENT FASTING Y/N/NP: YES
PATIENT FASTING Y/N/NP: YES
PLATELET # BLD AUTO: 217 10(3)UL (ref 150–450)
POTASSIUM SERPL-SCNC: 4.6 MMOL/L (ref 3.5–5.1)
RBC # BLD AUTO: 5.1 X10(6)UL
SODIUM SERPL-SCNC: 138 MMOL/L (ref 136–145)
T4 FREE SERPL-MCNC: 1.2 NG/DL (ref 0.8–1.7)
TRIGL SERPL-MCNC: 211 MG/DL (ref 30–149)
TSI SER-ACNC: 0.01 MIU/ML (ref 0.36–3.74)
VLDLC SERPL CALC-MCNC: 42 MG/DL (ref 0–30)
WBC # BLD AUTO: 7.6 X10(3) UL (ref 4–11)

## 2021-02-20 PROCEDURE — 80061 LIPID PANEL: CPT

## 2021-02-20 PROCEDURE — 36415 COLL VENOUS BLD VENIPUNCTURE: CPT

## 2021-02-20 PROCEDURE — 84443 ASSAY THYROID STIM HORMONE: CPT

## 2021-02-20 PROCEDURE — 84439 ASSAY OF FREE THYROXINE: CPT

## 2021-02-20 PROCEDURE — 80053 COMPREHEN METABOLIC PANEL: CPT

## 2021-02-20 PROCEDURE — 83036 HEMOGLOBIN GLYCOSYLATED A1C: CPT

## 2021-02-20 PROCEDURE — 85025 COMPLETE CBC W/AUTO DIFF WBC: CPT

## 2021-02-25 RX ORDER — LISINOPRIL AND HYDROCHLOROTHIAZIDE 20; 12.5 MG/1; MG/1
1 TABLET ORAL
Qty: 30 TABLET | Refills: 0 | Status: SHIPPED | OUTPATIENT
Start: 2021-02-25 | End: 2021-02-27

## 2021-02-27 ENCOUNTER — OFFICE VISIT (OUTPATIENT)
Dept: INTERNAL MEDICINE CLINIC | Facility: CLINIC | Age: 61
End: 2021-02-27
Payer: COMMERCIAL

## 2021-02-27 VITALS
TEMPERATURE: 98 F | SYSTOLIC BLOOD PRESSURE: 114 MMHG | DIASTOLIC BLOOD PRESSURE: 62 MMHG | RESPIRATION RATE: 16 BRPM | HEIGHT: 62.21 IN | WEIGHT: 152 LBS | HEART RATE: 60 BPM | BODY MASS INDEX: 27.62 KG/M2

## 2021-02-27 DIAGNOSIS — E11.65 UNCONTROLLED TYPE 2 DIABETES MELLITUS WITH HYPERGLYCEMIA (HCC): ICD-10-CM

## 2021-02-27 DIAGNOSIS — Z12.11 SCREEN FOR COLON CANCER: ICD-10-CM

## 2021-02-27 DIAGNOSIS — F17.200 SMOKER: ICD-10-CM

## 2021-02-27 DIAGNOSIS — E78.2 MIXED HYPERLIPIDEMIA: ICD-10-CM

## 2021-02-27 DIAGNOSIS — I10 ESSENTIAL HYPERTENSION: ICD-10-CM

## 2021-02-27 DIAGNOSIS — Z00.00 ROUTINE GENERAL MEDICAL EXAMINATION AT A HEALTH CARE FACILITY: Primary | ICD-10-CM

## 2021-02-27 DIAGNOSIS — R79.89 ABNORMAL TSH: ICD-10-CM

## 2021-02-27 PROCEDURE — 93000 ELECTROCARDIOGRAM COMPLETE: CPT | Performed by: INTERNAL MEDICINE

## 2021-02-27 PROCEDURE — 3078F DIAST BP <80 MM HG: CPT | Performed by: INTERNAL MEDICINE

## 2021-02-27 PROCEDURE — 99396 PREV VISIT EST AGE 40-64: CPT | Performed by: INTERNAL MEDICINE

## 2021-02-27 PROCEDURE — 3008F BODY MASS INDEX DOCD: CPT | Performed by: INTERNAL MEDICINE

## 2021-02-27 PROCEDURE — 3074F SYST BP LT 130 MM HG: CPT | Performed by: INTERNAL MEDICINE

## 2021-02-27 RX ORDER — LISINOPRIL AND HYDROCHLOROTHIAZIDE 20; 12.5 MG/1; MG/1
1 TABLET ORAL
Qty: 90 TABLET | Refills: 1 | Status: SHIPPED | OUTPATIENT
Start: 2021-02-27 | End: 2021-03-30

## 2021-02-27 RX ORDER — GLIPIZIDE 10 MG/1
10 TABLET, FILM COATED, EXTENDED RELEASE ORAL DAILY
Qty: 90 TABLET | Refills: 1 | Status: SHIPPED | OUTPATIENT
Start: 2021-02-27 | End: 2021-09-07

## 2021-02-27 RX ORDER — ROSUVASTATIN CALCIUM 5 MG/1
5 TABLET, COATED ORAL NIGHTLY
Qty: 90 TABLET | Refills: 1 | Status: SHIPPED | OUTPATIENT
Start: 2021-02-27

## 2021-02-27 NOTE — PROGRESS NOTES
Patient presents with:  Physical: SN Rm 3; Colonoscopy/FIT due, DM retinal due, DM foot due      HPI:    Patient here for CPE  DM2- not watching diet and BG ranging 150-200 last several weeks, gained a few pounds, compliant with glipizide, did not tolerate Years: 25.00        Types: Cigarettes        Start date: 8/10/2014        Quit date: 10/4/2016        Years since quittin.4      Smokeless tobacco: Never Used    Alcohol use: No      Frequency: Never    Drug use: No      Current Outpatient Rhonda Higigns No erythema. No pallor. Psychiatric: Normal mood and affect.      EKG: SB at 54 bpm, PACs, normal axis and intervals, no acute ST-T wave changes    A/P:    Routine general medical examination at a health care facility  (primary encounter diagnosis)- refer

## 2021-03-18 DIAGNOSIS — Z23 NEED FOR VACCINATION: ICD-10-CM

## 2021-03-25 ENCOUNTER — LAB ENCOUNTER (OUTPATIENT)
Dept: LAB | Facility: HOSPITAL | Age: 61
End: 2021-03-25
Attending: INTERNAL MEDICINE
Payer: COMMERCIAL

## 2021-03-25 DIAGNOSIS — Z12.11 SCREEN FOR COLON CANCER: ICD-10-CM

## 2021-03-29 LAB — HEMOCCULT STL QL: NEGATIVE

## 2021-03-29 PROCEDURE — 82274 ASSAY TEST FOR BLOOD FECAL: CPT

## 2021-03-30 DIAGNOSIS — I10 ESSENTIAL HYPERTENSION: ICD-10-CM

## 2021-03-30 RX ORDER — LISINOPRIL AND HYDROCHLOROTHIAZIDE 20; 12.5 MG/1; MG/1
1 TABLET ORAL
Qty: 30 TABLET | Refills: 0 | Status: SHIPPED | OUTPATIENT
Start: 2021-03-30 | End: 2021-05-08

## 2021-05-08 DIAGNOSIS — I10 ESSENTIAL HYPERTENSION: ICD-10-CM

## 2021-05-08 RX ORDER — LISINOPRIL AND HYDROCHLOROTHIAZIDE 20; 12.5 MG/1; MG/1
1 TABLET ORAL
Qty: 30 TABLET | Refills: 0 | Status: SHIPPED | OUTPATIENT
Start: 2021-05-08 | End: 2021-06-23

## 2021-06-19 ENCOUNTER — LAB ENCOUNTER (OUTPATIENT)
Dept: LAB | Facility: HOSPITAL | Age: 61
End: 2021-06-19
Attending: INTERNAL MEDICINE
Payer: COMMERCIAL

## 2021-06-19 DIAGNOSIS — E11.65 UNCONTROLLED TYPE 2 DIABETES MELLITUS WITH HYPERGLYCEMIA (HCC): ICD-10-CM

## 2021-06-19 PROCEDURE — 82570 ASSAY OF URINE CREATININE: CPT

## 2021-06-19 PROCEDURE — 82043 UR ALBUMIN QUANTITATIVE: CPT

## 2021-06-19 PROCEDURE — 80048 BASIC METABOLIC PNL TOTAL CA: CPT

## 2021-06-19 PROCEDURE — 36415 COLL VENOUS BLD VENIPUNCTURE: CPT

## 2021-06-19 PROCEDURE — 83036 HEMOGLOBIN GLYCOSYLATED A1C: CPT

## 2021-06-19 PROCEDURE — 3052F HG A1C>EQUAL 8.0%<EQUAL 9.0%: CPT | Performed by: INTERNAL MEDICINE

## 2021-06-19 PROCEDURE — 3061F NEG MICROALBUMINURIA REV: CPT | Performed by: INTERNAL MEDICINE

## 2021-06-22 ENCOUNTER — TELEPHONE (OUTPATIENT)
Dept: INTERNAL MEDICINE CLINIC | Facility: CLINIC | Age: 61
End: 2021-06-22

## 2021-06-22 NOTE — TELEPHONE ENCOUNTER
LOV 2/27/21 with TB, notified to f/u in 3 months for DM appt. TB, can we use a HFU appt on your schedule for 7/15/21 and order DM Labs?

## 2021-06-22 NOTE — TELEPHONE ENCOUNTER
Pt was advised to make a 40min appt with TB for diabetic appt. No appts available till august and pt prefers morning. Please advise how soon pt needs to come in. FYI pt stated he's scheduled for his eye  appt on Monday and will follow up.  Please advis

## 2021-06-22 NOTE — TELEPHONE ENCOUNTER
Offered pt 7/15/21 at 9:40am 40 minutes or 7/16/21 in the afternoon. Pt will check his schedule and call back to schedule. Pt verbalizes understanding.

## 2021-06-23 DIAGNOSIS — I10 ESSENTIAL HYPERTENSION: ICD-10-CM

## 2021-06-23 RX ORDER — LISINOPRIL AND HYDROCHLOROTHIAZIDE 20; 12.5 MG/1; MG/1
1 TABLET ORAL
Qty: 90 TABLET | Refills: 0 | Status: SHIPPED | OUTPATIENT
Start: 2021-06-23 | End: 2021-09-07

## 2021-07-12 NOTE — TELEPHONE ENCOUNTER
Pt scheduled on below for Diabetic follow up.  Used an HFU spot ok per TB.  FYI    Future Appointments   Date Time Provider [facility]   7/29/2021  9:40 AM Laxmi Hastings MD [facility]

## 2021-07-29 ENCOUNTER — OFFICE VISIT (OUTPATIENT)
Dept: INTERNAL MEDICINE CLINIC | Facility: CLINIC | Age: 61
End: 2021-07-29
Payer: COMMERCIAL

## 2021-07-29 VITALS
DIASTOLIC BLOOD PRESSURE: 68 MMHG | HEIGHT: 62.21 IN | WEIGHT: 153 LBS | TEMPERATURE: 97 F | BODY MASS INDEX: 27.8 KG/M2 | RESPIRATION RATE: 16 BRPM | HEART RATE: 72 BPM | SYSTOLIC BLOOD PRESSURE: 128 MMHG

## 2021-07-29 DIAGNOSIS — E11.65 UNCONTROLLED TYPE 2 DIABETES MELLITUS WITH HYPERGLYCEMIA (HCC): Primary | ICD-10-CM

## 2021-07-29 DIAGNOSIS — E78.5 HYPERLIPIDEMIA LDL GOAL <100: ICD-10-CM

## 2021-07-29 DIAGNOSIS — I10 ESSENTIAL HYPERTENSION: ICD-10-CM

## 2021-07-29 PROCEDURE — 3078F DIAST BP <80 MM HG: CPT | Performed by: INTERNAL MEDICINE

## 2021-07-29 PROCEDURE — 3008F BODY MASS INDEX DOCD: CPT | Performed by: INTERNAL MEDICINE

## 2021-07-29 PROCEDURE — 99214 OFFICE O/P EST MOD 30 MIN: CPT | Performed by: INTERNAL MEDICINE

## 2021-07-29 PROCEDURE — 3074F SYST BP LT 130 MM HG: CPT | Performed by: INTERNAL MEDICINE

## 2021-07-29 NOTE — PROGRESS NOTES
Negar Rubio is a 62year old male. Patient presents with: Follow - Up: SN 3; DM foot due, med review, chronic ailment review      HPI:     Patient here for f/u-  DM2- BG not well controlled, hgba1c up 8. 7. he is taking glipizide, does not tolerate Known Allergies      REVIEW OF SYSTEMS:   GENERAL HEALTH:  no fevers or chills  SKIN: denies any unusual skin lesions or rashes  RESPIRATORY: no cough  CARDIOVASCULAR: denies chest pain  GI: denies abdominal pain  : no dysuria  NEURO: denies headaches  P encounter       Imaging & Consults:  None    Return in about 2 months (around 9/29/2021) for f/u on diabetes and other chronic issues. There are no Patient Instructions on file for this visit.       The patient indicates understanding of these issues and a

## 2021-09-04 DIAGNOSIS — E11.65 UNCONTROLLED TYPE 2 DIABETES MELLITUS WITH HYPERGLYCEMIA (HCC): ICD-10-CM

## 2021-09-04 DIAGNOSIS — I10 ESSENTIAL HYPERTENSION: ICD-10-CM

## 2021-09-07 RX ORDER — GLIPIZIDE 10 MG/1
TABLET, FILM COATED, EXTENDED RELEASE ORAL
Qty: 90 TABLET | Refills: 0 | Status: SHIPPED | OUTPATIENT
Start: 2021-09-07 | End: 2022-01-31

## 2021-09-07 RX ORDER — LISINOPRIL AND HYDROCHLOROTHIAZIDE 20; 12.5 MG/1; MG/1
1 TABLET ORAL
Qty: 90 TABLET | Refills: 0 | Status: SHIPPED | OUTPATIENT
Start: 2021-09-07 | End: 2021-10-02

## 2021-09-07 NOTE — TELEPHONE ENCOUNTER
Been Following TB  Last OV 7/29/21  Last CPE 2/27/21  Last Labs Microalb/creat, BMP, A1c 6/19/21     Last Rx fill Glipizide ER 10mg #90 1R 2/27/21     No future appointments. Per PROTOCOL glipizide failed, last A1c out of range. Other pass.     Please Ap

## 2021-09-09 NOTE — TELEPHONE ENCOUNTER
FWD to [facility], please assist in scheduling appt per TB

## 2021-10-01 DIAGNOSIS — I10 ESSENTIAL HYPERTENSION: ICD-10-CM

## 2021-10-02 RX ORDER — LISINOPRIL AND HYDROCHLOROTHIAZIDE 20; 12.5 MG/1; MG/1
1 TABLET ORAL
Qty: 30 TABLET | Refills: 0 | Status: SHIPPED | OUTPATIENT
Start: 2021-10-02 | End: 2022-03-21

## 2022-01-29 DIAGNOSIS — E11.65 UNCONTROLLED TYPE 2 DIABETES MELLITUS WITH HYPERGLYCEMIA (HCC): ICD-10-CM

## 2022-01-31 RX ORDER — GLIPIZIDE 10 MG/1
TABLET, FILM COATED, EXTENDED RELEASE ORAL
Qty: 30 TABLET | Refills: 0 | Status: SHIPPED | OUTPATIENT
Start: 2022-01-31

## 2022-01-31 NOTE — TELEPHONE ENCOUNTER
Last VISIT - 7/29/21 Meds and chronic ailments    Last CPE - 2/27/21     Last REFILL -     GLIPIZIDE 10 MG Oral Tablet 24 Hr 90 tablet 0 9/7/2021     Last LABS - 6/19/21 bmp 2/20/21 cmp, cbc, lipid, A1C, tsh, psa    No future appointments.       Per Lael Fothergill

## 2022-02-07 ENCOUNTER — TELEPHONE (OUTPATIENT)
Dept: INTERNAL MEDICINE CLINIC | Facility: CLINIC | Age: 62
End: 2022-02-07

## 2022-02-07 NOTE — TELEPHONE ENCOUNTER
Future Appointments   Date Time Provider Ramon Peters   4/23/2022  8:20 AM Shahnaz Joy MD [facility]

## 2022-02-07 NOTE — TELEPHONE ENCOUNTER
Future Appointments   Date Time Provider Ramon Peters   4/23/2022  8:20 AM Vivek Staley MD [facility]     Orders to edward- Pt informed that labs need to be completed no sooner than 2 weeks prior to the appt.  Pt aware to fast-no call back required

## 2022-03-21 RX ORDER — GLIPIZIDE 10 MG/1
TABLET, FILM COATED, EXTENDED RELEASE ORAL
Qty: 30 TABLET | Refills: 0 | Status: SHIPPED | OUTPATIENT
Start: 2022-03-21

## 2022-03-21 RX ORDER — LISINOPRIL AND HYDROCHLOROTHIAZIDE 20; 12.5 MG/1; MG/1
1 TABLET ORAL
Qty: 30 TABLET | Refills: 0 | Status: SHIPPED | OUTPATIENT
Start: 2022-03-21

## 2022-03-21 NOTE — TELEPHONE ENCOUNTER
Last VISIT -  7/29/21 f/u for meds and chronic ailments    Last CPE - 2/27/21    Last REFILL -     LISINOPRIL-HYDROCHLOROTHIAZIDE 20-12.5 MG Oral Tab 30 tablet 0 10/2/2021    PASSED    GLIPIZIDE ER 10 MG Oral Tablet 24 Hr 30 tablet 0 1/31/2022   FAILED  Last LABS - 6/19/21 A1C, bmp 2/20/21 cmp, cbc, lipid, tsh, psa    //Future Appointments    Time Provider Ramon Peters   4/23/2022  8:20 AM Tori Patton MD [facility]       Per PROTOCOL? Failed    Please Approve or Deny.

## 2022-04-16 ENCOUNTER — LAB ENCOUNTER (OUTPATIENT)
Dept: LAB | Facility: HOSPITAL | Age: 62
End: 2022-04-16
Attending: INTERNAL MEDICINE
Payer: COMMERCIAL

## 2022-04-16 DIAGNOSIS — I10 ESSENTIAL HYPERTENSION: ICD-10-CM

## 2022-04-16 DIAGNOSIS — Z00.00 ROUTINE GENERAL MEDICAL EXAMINATION AT A HEALTH CARE FACILITY: ICD-10-CM

## 2022-04-16 DIAGNOSIS — Z13.220 SCREENING FOR LIPID DISORDERS: ICD-10-CM

## 2022-04-16 DIAGNOSIS — Z13.0 SCREENING FOR BLOOD DISEASE: ICD-10-CM

## 2022-04-16 DIAGNOSIS — Z13.228 SCREENING FOR METABOLIC DISORDER: ICD-10-CM

## 2022-04-16 DIAGNOSIS — E78.5 HYPERLIPIDEMIA LDL GOAL <100: ICD-10-CM

## 2022-04-16 DIAGNOSIS — E11.65 UNCONTROLLED TYPE 2 DIABETES MELLITUS WITH HYPERGLYCEMIA (HCC): ICD-10-CM

## 2022-04-16 DIAGNOSIS — Z13.29 SCREENING FOR THYROID DISORDER: ICD-10-CM

## 2022-04-16 LAB
ALBUMIN SERPL-MCNC: 3.8 G/DL (ref 3.4–5)
ALBUMIN/GLOB SERPL: 1.2 {RATIO} (ref 1–2)
ALP LIVER SERPL-CCNC: 86 U/L
ALT SERPL-CCNC: 48 U/L
ANION GAP SERPL CALC-SCNC: 6 MMOL/L (ref 0–18)
AST SERPL-CCNC: 18 U/L (ref 15–37)
BASOPHILS # BLD AUTO: 0.04 X10(3) UL (ref 0–0.2)
BASOPHILS NFR BLD AUTO: 0.6 %
BILIRUB SERPL-MCNC: 0.8 MG/DL (ref 0.1–2)
BUN BLD-MCNC: 37 MG/DL (ref 7–18)
CALCIUM BLD-MCNC: 9 MG/DL (ref 8.5–10.1)
CHLORIDE SERPL-SCNC: 108 MMOL/L (ref 98–112)
CHOLEST SERPL-MCNC: 138 MG/DL (ref ?–200)
CO2 SERPL-SCNC: 24 MMOL/L (ref 21–32)
CREAT BLD-MCNC: 1.72 MG/DL
EOSINOPHIL # BLD AUTO: 0.35 X10(3) UL (ref 0–0.7)
EOSINOPHIL NFR BLD AUTO: 5.4 %
ERYTHROCYTE [DISTWIDTH] IN BLOOD BY AUTOMATED COUNT: 12.3 %
EST. AVERAGE GLUCOSE BLD GHB EST-MCNC: 206 MG/DL (ref 68–126)
FASTING PATIENT LIPID ANSWER: YES
FASTING STATUS PATIENT QL REPORTED: YES
GLOBULIN PLAS-MCNC: 3.3 G/DL (ref 2.8–4.4)
GLUCOSE BLD-MCNC: 191 MG/DL (ref 70–99)
HBA1C MFR BLD: 8.8 % (ref ?–5.7)
HCT VFR BLD AUTO: 45.2 %
HDLC SERPL-MCNC: 32 MG/DL (ref 40–59)
HGB BLD-MCNC: 15.1 G/DL
IMM GRANULOCYTES # BLD AUTO: 0.04 X10(3) UL (ref 0–1)
IMM GRANULOCYTES NFR BLD: 0.6 %
LDLC SERPL CALC-MCNC: 77 MG/DL (ref ?–100)
LYMPHOCYTES # BLD AUTO: 1.55 X10(3) UL (ref 1–4)
LYMPHOCYTES NFR BLD AUTO: 23.7 %
MCH RBC QN AUTO: 30.5 PG (ref 26–34)
MCHC RBC AUTO-ENTMCNC: 33.4 G/DL (ref 31–37)
MCV RBC AUTO: 91.3 FL
MONOCYTES # BLD AUTO: 0.51 X10(3) UL (ref 0.1–1)
MONOCYTES NFR BLD AUTO: 7.8 %
NEUTROPHILS # BLD AUTO: 4.05 X10 (3) UL (ref 1.5–7.7)
NEUTROPHILS # BLD AUTO: 4.05 X10(3) UL (ref 1.5–7.7)
NEUTROPHILS NFR BLD AUTO: 61.9 %
NONHDLC SERPL-MCNC: 106 MG/DL (ref ?–130)
OSMOLALITY SERPL CALC.SUM OF ELEC: 300 MOSM/KG (ref 275–295)
PLATELET # BLD AUTO: 200 10(3)UL (ref 150–450)
POTASSIUM SERPL-SCNC: 4.6 MMOL/L (ref 3.5–5.1)
PROT SERPL-MCNC: 7.1 G/DL (ref 6.4–8.2)
RBC # BLD AUTO: 4.95 X10(6)UL
SODIUM SERPL-SCNC: 138 MMOL/L (ref 136–145)
T3FREE SERPL-MCNC: 3.64 PG/ML (ref 2.4–4.2)
T4 FREE SERPL-MCNC: 1.3 NG/DL (ref 0.8–1.7)
TRIGL SERPL-MCNC: 168 MG/DL (ref 30–149)
TSI SER-ACNC: 0.04 MIU/ML (ref 0.36–3.74)
VLDLC SERPL CALC-MCNC: 26 MG/DL (ref 0–30)
WBC # BLD AUTO: 6.5 X10(3) UL (ref 4–11)

## 2022-04-16 PROCEDURE — 84481 FREE ASSAY (FT-3): CPT

## 2022-04-16 PROCEDURE — 85025 COMPLETE CBC W/AUTO DIFF WBC: CPT

## 2022-04-16 PROCEDURE — 83036 HEMOGLOBIN GLYCOSYLATED A1C: CPT

## 2022-04-16 PROCEDURE — 84443 ASSAY THYROID STIM HORMONE: CPT

## 2022-04-16 PROCEDURE — 36415 COLL VENOUS BLD VENIPUNCTURE: CPT

## 2022-04-16 PROCEDURE — 80061 LIPID PANEL: CPT

## 2022-04-16 PROCEDURE — 80053 COMPREHEN METABOLIC PANEL: CPT

## 2022-04-16 PROCEDURE — 84439 ASSAY OF FREE THYROXINE: CPT

## 2022-04-23 ENCOUNTER — OFFICE VISIT (OUTPATIENT)
Dept: INTERNAL MEDICINE CLINIC | Facility: CLINIC | Age: 62
End: 2022-04-23
Payer: COMMERCIAL

## 2022-04-23 VITALS
SYSTOLIC BLOOD PRESSURE: 114 MMHG | DIASTOLIC BLOOD PRESSURE: 72 MMHG | HEART RATE: 80 BPM | TEMPERATURE: 98 F | HEIGHT: 62.21 IN | BODY MASS INDEX: 27.62 KG/M2 | WEIGHT: 152 LBS

## 2022-04-23 DIAGNOSIS — I10 ESSENTIAL HYPERTENSION: ICD-10-CM

## 2022-04-23 DIAGNOSIS — Z00.00 ROUTINE GENERAL MEDICAL EXAMINATION AT A HEALTH CARE FACILITY: Primary | ICD-10-CM

## 2022-04-23 DIAGNOSIS — E78.5 HYPERLIPIDEMIA LDL GOAL <100: ICD-10-CM

## 2022-04-23 DIAGNOSIS — E11.65 UNCONTROLLED TYPE 2 DIABETES MELLITUS WITH HYPERGLYCEMIA (HCC): ICD-10-CM

## 2022-04-23 DIAGNOSIS — N18.32 STAGE 3B CHRONIC KIDNEY DISEASE (HCC): ICD-10-CM

## 2022-04-23 DIAGNOSIS — R79.89 ABNORMAL TSH: ICD-10-CM

## 2022-04-23 DIAGNOSIS — Z12.11 SCREEN FOR COLON CANCER: ICD-10-CM

## 2022-04-25 RX ORDER — LISINOPRIL AND HYDROCHLOROTHIAZIDE 20; 12.5 MG/1; MG/1
1 TABLET ORAL
Qty: 30 TABLET | Refills: 0 | Status: SHIPPED | OUTPATIENT
Start: 2022-04-25

## 2022-04-25 NOTE — TELEPHONE ENCOUNTER
Last VISIT - 4/23/22 Physical    Last CPE - 4/23/22    Last REFILL -     LISINOPRIL-HYDROCHLOROTHIAZIDE 20-12.5 MG Oral Tab 30 tablet 0 3/21/2022     Last LABS - 4/16/22 tsh, lipid, cmp, cbc, A1C    No future appointments. Per PROTOCOL? PASSED    Please Approve or Deny.

## 2022-04-27 RX ORDER — GLIPIZIDE 10 MG/1
TABLET, FILM COATED, EXTENDED RELEASE ORAL
Qty: 90 TABLET | Refills: 0 | Status: SHIPPED | OUTPATIENT
Start: 2022-04-27

## 2022-04-27 NOTE — TELEPHONE ENCOUNTER
Last VISIT - 4/23/22 Physical    Last CPE - 4/23/22    Last REFILL -     GLIPIZIDE ER 10 MG Oral Tablet 24 Hr 30 tablet 0 3/21/2022     Last LABS - 4/16/22 TSH, LIPID, CMP, CBC, A1C    No future appointments. Per PROTOCOL? FAILED    Please Approve or Deny.

## 2022-06-05 DIAGNOSIS — I10 ESSENTIAL HYPERTENSION: ICD-10-CM

## 2022-06-05 DIAGNOSIS — E78.2 MIXED HYPERLIPIDEMIA: ICD-10-CM

## 2022-06-06 RX ORDER — LISINOPRIL AND HYDROCHLOROTHIAZIDE 20; 12.5 MG/1; MG/1
1 TABLET ORAL
Qty: 30 TABLET | Refills: 0 | Status: SHIPPED | OUTPATIENT
Start: 2022-06-06

## 2022-06-06 RX ORDER — ROSUVASTATIN CALCIUM 5 MG/1
TABLET, COATED ORAL
Qty: 90 TABLET | Refills: 0 | Status: SHIPPED | OUTPATIENT
Start: 2022-06-06

## 2022-06-06 NOTE — TELEPHONE ENCOUNTER
Last VISIT - 4/23/22 Physical    Last CPE - 4/23/22    Last REFILL -     LISINOPRIL-HYDROCHLOROTHIAZIDE 20-12.5 MG Oral Tab 30 tablet 0 4/25/2022      Rosuvastatin Calcium 5 MG Oral Tab 90 tablet 1 2/27/2021      Last LABS - 4/16/22 sh, lipid, cmp, cbc, a1c    No future appointments. Per PROTOCOL? PASSED    Please Approve or Deny.

## 2022-07-22 DIAGNOSIS — I10 ESSENTIAL HYPERTENSION: ICD-10-CM

## 2022-07-22 RX ORDER — LISINOPRIL AND HYDROCHLOROTHIAZIDE 20; 12.5 MG/1; MG/1
1 TABLET ORAL
Qty: 30 TABLET | Refills: 0 | Status: SHIPPED | OUTPATIENT
Start: 2022-07-22

## 2022-08-29 DIAGNOSIS — I10 ESSENTIAL HYPERTENSION: ICD-10-CM

## 2022-08-31 RX ORDER — LISINOPRIL AND HYDROCHLOROTHIAZIDE 20; 12.5 MG/1; MG/1
1 TABLET ORAL
Qty: 30 TABLET | Refills: 0 | Status: SHIPPED | OUTPATIENT
Start: 2022-08-31

## 2022-09-15 ENCOUNTER — TELEPHONE (OUTPATIENT)
Dept: INTERNAL MEDICINE CLINIC | Facility: CLINIC | Age: 62
End: 2022-09-15

## 2022-09-15 DIAGNOSIS — E11.65 UNCONTROLLED TYPE 2 DIABETES MELLITUS WITH HYPERGLYCEMIA (HCC): ICD-10-CM

## 2022-09-15 RX ORDER — GLIPIZIDE 10 MG/1
TABLET, FILM COATED, EXTENDED RELEASE ORAL
Qty: 30 TABLET | Refills: 0 | Status: SHIPPED | OUTPATIENT
Start: 2022-09-15

## 2022-09-15 NOTE — TELEPHONE ENCOUNTER
Last VISIT - 4/23/22 Physical    Last CPE - 4/23/22    Last REFILL -     GLIPIZIDE ER 10 MG Oral Tablet 24 Hr 90 tablet 0 4/27/2022      Last LABS - 4/16/22 tsh, lipid, cmp, cbc    No future appointments. Per PROTOCOL? FAILED    Please Approve or Deny.

## 2022-09-16 NOTE — TELEPHONE ENCOUNTER
30 day given, needs to check labs and make follow up appt Split-Thickness Skin Graft Text: The defect edges were debeveled with a #15 scalpel blade.  Given the location of the defect, shape of the defect and the proximity to free margins a split thickness skin graft was deemed most appropriate.  Using a sterile surgical marker, the primary defect shape was transferred to the donor site. The split thickness graft was then harvested.  The skin graft was then placed in the primary defect and oriented appropriately.

## 2022-10-23 DIAGNOSIS — I10 ESSENTIAL HYPERTENSION: ICD-10-CM

## 2022-10-23 DIAGNOSIS — E11.65 UNCONTROLLED TYPE 2 DIABETES MELLITUS WITH HYPERGLYCEMIA (HCC): ICD-10-CM

## 2022-10-24 RX ORDER — LISINOPRIL AND HYDROCHLOROTHIAZIDE 20; 12.5 MG/1; MG/1
1 TABLET ORAL
Qty: 30 TABLET | Refills: 0 | Status: SHIPPED | OUTPATIENT
Start: 2022-10-24

## 2022-10-24 RX ORDER — GLIPIZIDE 10 MG/1
TABLET, FILM COATED, EXTENDED RELEASE ORAL
Qty: 30 TABLET | Refills: 0 | Status: SHIPPED | OUTPATIENT
Start: 2022-10-24

## 2022-10-24 NOTE — TELEPHONE ENCOUNTER
Last VISIT - 4/23/22 Physical    Last CPE - 4/23/22    Last REFILL -     GLIPIZIDE ER 10 MG Oral Tablet 24 Hr 30 tablet 0 9/15/2022     LISINOPRIL-HYDROCHLOROTHIAZIDE 20-12.5 MG Oral Tab 30 tablet 0 8/31/2022     Last LABS - 4/16/22 tsh, lipid, cmp, cbc, a1c    No future appointments. Per PROTOCOL? FAILED    Please Approve or Deny.

## 2022-10-26 DIAGNOSIS — E11.65 UNCONTROLLED TYPE 2 DIABETES MELLITUS WITH HYPERGLYCEMIA (HCC): ICD-10-CM

## 2022-10-26 DIAGNOSIS — I10 ESSENTIAL HYPERTENSION: ICD-10-CM

## 2022-10-26 NOTE — TELEPHONE ENCOUNTER
Last VISIT - 4/23/22 Physical    Last CPE - 4/23/22    Last REFILL -     GLIPIZIDE ER 10 MG Oral Tablet 24 Hr 30 tablet 0 10/24/2022      LISINOPRIL-HYDROCHLOROTHIAZIDE 20-12.5 MG Oral Tab 30 tablet 0 10/24/2022     Last LABS - 4/16/22 tsh, lipid, cmp, cbc    No future appointments. Per PROTOCOL? FAILED    Please Approve or Deny.

## 2022-10-27 RX ORDER — GLIPIZIDE 10 MG/1
10 TABLET, FILM COATED, EXTENDED RELEASE ORAL DAILY
Qty: 30 TABLET | Refills: 0 | OUTPATIENT
Start: 2022-10-27

## 2022-10-27 RX ORDER — LISINOPRIL AND HYDROCHLOROTHIAZIDE 20; 12.5 MG/1; MG/1
1 TABLET ORAL
Qty: 30 TABLET | Refills: 0 | OUTPATIENT
Start: 2022-10-27

## 2023-02-08 DIAGNOSIS — E78.2 MIXED HYPERLIPIDEMIA: ICD-10-CM

## 2023-02-08 DIAGNOSIS — I10 ESSENTIAL HYPERTENSION: ICD-10-CM

## 2023-02-08 RX ORDER — LISINOPRIL AND HYDROCHLOROTHIAZIDE 20; 12.5 MG/1; MG/1
1 TABLET ORAL
Qty: 10 TABLET | Refills: 1 | Status: SHIPPED | OUTPATIENT
Start: 2023-02-08

## 2023-02-08 RX ORDER — ROSUVASTATIN CALCIUM 5 MG/1
TABLET, COATED ORAL
Qty: 90 TABLET | Refills: 1 | Status: SHIPPED | OUTPATIENT
Start: 2023-02-08

## 2023-03-04 ENCOUNTER — LAB ENCOUNTER (OUTPATIENT)
Dept: LAB | Facility: HOSPITAL | Age: 63
End: 2023-03-04
Attending: INTERNAL MEDICINE
Payer: COMMERCIAL

## 2023-03-04 DIAGNOSIS — N18.32 STAGE 3B CHRONIC KIDNEY DISEASE (HCC): ICD-10-CM

## 2023-03-04 DIAGNOSIS — E11.65 UNCONTROLLED TYPE 2 DIABETES MELLITUS WITH HYPERGLYCEMIA (HCC): ICD-10-CM

## 2023-03-04 DIAGNOSIS — R79.89 ABNORMAL TSH: ICD-10-CM

## 2023-03-04 DIAGNOSIS — I10 ESSENTIAL HYPERTENSION: ICD-10-CM

## 2023-03-04 LAB
ANION GAP SERPL CALC-SCNC: 4 MMOL/L (ref 0–18)
BUN BLD-MCNC: 24 MG/DL (ref 7–18)
CALCIUM BLD-MCNC: 9.3 MG/DL (ref 8.5–10.1)
CHLORIDE SERPL-SCNC: 108 MMOL/L (ref 98–112)
CHOLEST SERPL-MCNC: 155 MG/DL (ref ?–200)
CO2 SERPL-SCNC: 26 MMOL/L (ref 21–32)
CREAT BLD-MCNC: 1.29 MG/DL
CREAT UR-SCNC: 218 MG/DL
EST. AVERAGE GLUCOSE BLD GHB EST-MCNC: 235 MG/DL (ref 68–126)
FASTING PATIENT LIPID ANSWER: YES
FASTING STATUS PATIENT QL REPORTED: YES
GFR SERPLBLD BASED ON 1.73 SQ M-ARVRAT: 63 ML/MIN/1.73M2 (ref 60–?)
GLUCOSE BLD-MCNC: 168 MG/DL (ref 70–99)
HBA1C MFR BLD: 9.8 % (ref ?–5.7)
HDLC SERPL-MCNC: 38 MG/DL (ref 40–59)
LDLC SERPL CALC-MCNC: 88 MG/DL (ref ?–100)
MICROALBUMIN UR-MCNC: 9.17 MG/DL
MICROALBUMIN/CREAT 24H UR-RTO: 42.1 UG/MG (ref ?–30)
NONHDLC SERPL-MCNC: 117 MG/DL (ref ?–130)
OSMOLALITY SERPL CALC.SUM OF ELEC: 294 MOSM/KG (ref 275–295)
POTASSIUM SERPL-SCNC: 3.8 MMOL/L (ref 3.5–5.1)
SODIUM SERPL-SCNC: 138 MMOL/L (ref 136–145)
TRIGL SERPL-MCNC: 167 MG/DL (ref 30–149)
TSI SER-ACNC: 0.62 MIU/ML (ref 0.36–3.74)
VLDLC SERPL CALC-MCNC: 27 MG/DL (ref 0–30)

## 2023-03-04 PROCEDURE — 80048 BASIC METABOLIC PNL TOTAL CA: CPT

## 2023-03-04 PROCEDURE — 83036 HEMOGLOBIN GLYCOSYLATED A1C: CPT

## 2023-03-04 PROCEDURE — 84443 ASSAY THYROID STIM HORMONE: CPT

## 2023-03-04 PROCEDURE — 36415 COLL VENOUS BLD VENIPUNCTURE: CPT

## 2023-03-04 PROCEDURE — 3061F NEG MICROALBUMINURIA REV: CPT | Performed by: INTERNAL MEDICINE

## 2023-03-04 PROCEDURE — 82570 ASSAY OF URINE CREATININE: CPT

## 2023-03-04 PROCEDURE — 3060F POS MICROALBUMINURIA REV: CPT | Performed by: INTERNAL MEDICINE

## 2023-03-04 PROCEDURE — 82043 UR ALBUMIN QUANTITATIVE: CPT

## 2023-03-04 PROCEDURE — 3046F HEMOGLOBIN A1C LEVEL >9.0%: CPT | Performed by: INTERNAL MEDICINE

## 2023-03-04 PROCEDURE — 80061 LIPID PANEL: CPT

## 2023-03-11 ENCOUNTER — OFFICE VISIT (OUTPATIENT)
Dept: INTERNAL MEDICINE CLINIC | Facility: CLINIC | Age: 63
End: 2023-03-11
Payer: COMMERCIAL

## 2023-03-11 VITALS
BODY MASS INDEX: 26.89 KG/M2 | DIASTOLIC BLOOD PRESSURE: 84 MMHG | HEIGHT: 62.21 IN | SYSTOLIC BLOOD PRESSURE: 128 MMHG | OXYGEN SATURATION: 96 % | HEART RATE: 83 BPM | TEMPERATURE: 98 F | WEIGHT: 148 LBS | RESPIRATION RATE: 18 BRPM

## 2023-03-11 DIAGNOSIS — F17.200 SMOKER: ICD-10-CM

## 2023-03-11 DIAGNOSIS — E78.2 MIXED HYPERLIPIDEMIA: ICD-10-CM

## 2023-03-11 DIAGNOSIS — I10 ESSENTIAL HYPERTENSION: ICD-10-CM

## 2023-03-11 DIAGNOSIS — E11.65 UNCONTROLLED TYPE 2 DIABETES MELLITUS WITH HYPERGLYCEMIA (HCC): Primary | ICD-10-CM

## 2023-03-11 DIAGNOSIS — Z12.5 ENCOUNTER FOR SPECIAL SCREENING EXAMINATION FOR NEOPLASM OF PROSTATE: ICD-10-CM

## 2023-03-11 DIAGNOSIS — Z12.11 SCREEN FOR COLON CANCER: ICD-10-CM

## 2023-03-11 PROCEDURE — 3079F DIAST BP 80-89 MM HG: CPT | Performed by: INTERNAL MEDICINE

## 2023-03-11 PROCEDURE — 99214 OFFICE O/P EST MOD 30 MIN: CPT | Performed by: INTERNAL MEDICINE

## 2023-03-11 PROCEDURE — 3008F BODY MASS INDEX DOCD: CPT | Performed by: INTERNAL MEDICINE

## 2023-03-11 PROCEDURE — 3074F SYST BP LT 130 MM HG: CPT | Performed by: INTERNAL MEDICINE

## 2023-03-11 RX ORDER — GLIPIZIDE 10 MG/1
10 TABLET, FILM COATED, EXTENDED RELEASE ORAL DAILY
Qty: 90 TABLET | Refills: 0 | Status: SHIPPED | OUTPATIENT
Start: 2023-03-11

## 2023-03-12 DIAGNOSIS — I10 ESSENTIAL HYPERTENSION: ICD-10-CM

## 2023-03-13 RX ORDER — LISINOPRIL AND HYDROCHLOROTHIAZIDE 20; 12.5 MG/1; MG/1
1 TABLET ORAL
Qty: 20 TABLET | Refills: 0 | Status: SHIPPED | OUTPATIENT
Start: 2023-03-13

## 2023-03-20 ENCOUNTER — APPOINTMENT (OUTPATIENT)
Dept: LAB | Age: 63
End: 2023-03-20
Attending: INTERNAL MEDICINE
Payer: COMMERCIAL

## 2023-03-20 ENCOUNTER — LAB ENCOUNTER (OUTPATIENT)
Dept: LAB | Age: 63
End: 2023-03-20
Attending: INTERNAL MEDICINE
Payer: COMMERCIAL

## 2023-04-10 DIAGNOSIS — I10 ESSENTIAL HYPERTENSION: ICD-10-CM

## 2023-04-11 RX ORDER — LISINOPRIL AND HYDROCHLOROTHIAZIDE 20; 12.5 MG/1; MG/1
1 TABLET ORAL
Qty: 20 TABLET | Refills: 0 | OUTPATIENT
Start: 2023-04-11

## 2023-04-11 RX ORDER — LISINOPRIL AND HYDROCHLOROTHIAZIDE 20; 12.5 MG/1; MG/1
1 TABLET ORAL
Qty: 10 TABLET | Refills: 0 | Status: SHIPPED | OUTPATIENT
Start: 2023-04-11

## 2023-05-15 DIAGNOSIS — I10 ESSENTIAL HYPERTENSION: ICD-10-CM

## 2023-05-15 RX ORDER — LISINOPRIL AND HYDROCHLOROTHIAZIDE 20; 12.5 MG/1; MG/1
1 TABLET ORAL
Qty: 90 TABLET | Refills: 0 | Status: SHIPPED | OUTPATIENT
Start: 2023-05-15

## 2023-05-16 ENCOUNTER — TELEPHONE (OUTPATIENT)
Dept: INTERNAL MEDICINE CLINIC | Facility: CLINIC | Age: 63
End: 2023-05-16

## 2023-05-16 NOTE — TELEPHONE ENCOUNTER
Pt stated TB asked him to call her with 1month of readings. Pt stated he faxed over 2 pages worth of his blood sugars on 4/17 and never heard back. Pt stated he will refax them now.

## 2023-05-16 NOTE — TELEPHONE ENCOUNTER
Pt scheduled on below for cpe   Future Appointments   Date Time Provider Ramon Lorraine   6/17/2023  9:00 AM Jhon Orantes MD [facility]

## 2023-05-17 NOTE — TELEPHONE ENCOUNTER
Pt called back stated he faxed it a month ago and again yesterday. He stated he just looked at his conf sheet and it said no dial tone. He will fax it again [phone] to [employer]'s attn.

## 2023-05-17 NOTE — TELEPHONE ENCOUNTER
Pt stated his company's fax isn't working. Pt will drop it off on Saturday or fax from [employer] by him.

## 2023-05-17 NOTE — TELEPHONE ENCOUNTER
Readings have not been seen as of yet. Checked in TB bin, f/u folder and . LVM with patient to confirm fax number it was sent to.

## 2023-05-19 NOTE — TELEPHONE ENCOUNTER
Received glucose results for the month of March and April. Nothing for May. TB reviewed and sent to scanning.

## 2023-08-18 ENCOUNTER — TELEPHONE (OUTPATIENT)
Dept: INTERNAL MEDICINE CLINIC | Facility: CLINIC | Age: 63
End: 2023-08-18

## 2023-08-18 DIAGNOSIS — Z00.00 ROUTINE GENERAL MEDICAL EXAMINATION AT A HEALTH CARE FACILITY: ICD-10-CM

## 2023-08-18 DIAGNOSIS — I10 ESSENTIAL HYPERTENSION: Primary | ICD-10-CM

## 2023-08-18 DIAGNOSIS — Z13.0 SCREENING FOR BLOOD DISEASE: ICD-10-CM

## 2023-08-18 NOTE — TELEPHONE ENCOUNTER
Ordered CBC per Karolee Rocher protocol.  Labs completed per Karolee Rocher protocol on 3/4/23 for lipid, A1c, tsh, and cmp

## 2023-08-18 NOTE — TELEPHONE ENCOUNTER
Please call pt once orders are placed.  [facility]    Future Appointments   Date Time Provider Ramon Lorraine   9/9/2023  9:40 AM Marla Welch MD [facility]

## 2023-08-20 DIAGNOSIS — I10 ESSENTIAL HYPERTENSION: ICD-10-CM

## 2023-08-20 DIAGNOSIS — E11.65 UNCONTROLLED TYPE 2 DIABETES MELLITUS WITH HYPERGLYCEMIA (HCC): ICD-10-CM

## 2023-08-21 RX ORDER — LISINOPRIL AND HYDROCHLOROTHIAZIDE 20; 12.5 MG/1; MG/1
1 TABLET ORAL
Qty: 90 TABLET | Refills: 0 | Status: SHIPPED | OUTPATIENT
Start: 2023-08-21

## 2023-08-21 RX ORDER — GLIPIZIDE 10 MG/1
10 TABLET, FILM COATED, EXTENDED RELEASE ORAL DAILY
Qty: 90 TABLET | Refills: 0 | Status: SHIPPED | OUTPATIENT
Start: 2023-08-21

## 2023-08-21 NOTE — TELEPHONE ENCOUNTER
Last VISIT - 3/11/23 f/u for diabetes, blood pressure, cholesterol     Last CPE - 4/23/22    Last REFILL -     glipiZIDE ER 10 MG Oral Tablet 24 Hr 90 tablet 0 3/11/2023     Last LABS - 3/4/23 microalbumin, lipid, bmp, A1c and tsh    Future Appointments   Date Time Provider Ramon Peters   9/9/2023  9:40 AM Conner Wu MD [facility]     Per PROTOCOL? Failed    Please Approve or Deny.

## 2023-09-02 ENCOUNTER — LAB ENCOUNTER (OUTPATIENT)
Dept: LAB | Facility: HOSPITAL | Age: 63
End: 2023-09-02
Attending: INTERNAL MEDICINE
Payer: COMMERCIAL

## 2023-09-02 DIAGNOSIS — E11.65 UNCONTROLLED TYPE 2 DIABETES MELLITUS WITH HYPERGLYCEMIA (HCC): ICD-10-CM

## 2023-09-02 DIAGNOSIS — E78.2 MIXED HYPERLIPIDEMIA: ICD-10-CM

## 2023-09-02 DIAGNOSIS — Z00.00 ROUTINE GENERAL MEDICAL EXAMINATION AT A HEALTH CARE FACILITY: ICD-10-CM

## 2023-09-02 DIAGNOSIS — Z13.0 SCREENING FOR BLOOD DISEASE: ICD-10-CM

## 2023-09-02 DIAGNOSIS — I10 ESSENTIAL HYPERTENSION: ICD-10-CM

## 2023-09-02 DIAGNOSIS — Z12.5 ENCOUNTER FOR SPECIAL SCREENING EXAMINATION FOR NEOPLASM OF PROSTATE: ICD-10-CM

## 2023-09-02 LAB
ALBUMIN SERPL-MCNC: 3.7 G/DL (ref 3.4–5)
ALBUMIN/GLOB SERPL: 1.1 {RATIO} (ref 1–2)
ALP LIVER SERPL-CCNC: 95 U/L
ALT SERPL-CCNC: 49 U/L
ANION GAP SERPL CALC-SCNC: 9 MMOL/L (ref 0–18)
AST SERPL-CCNC: 17 U/L (ref 15–37)
BASOPHILS # BLD AUTO: 0.05 X10(3) UL (ref 0–0.2)
BASOPHILS NFR BLD AUTO: 0.8 %
BILIRUB SERPL-MCNC: 0.6 MG/DL (ref 0.1–2)
BUN BLD-MCNC: 33 MG/DL (ref 7–18)
CALCIUM BLD-MCNC: 9.1 MG/DL (ref 8.5–10.1)
CHLORIDE SERPL-SCNC: 106 MMOL/L (ref 98–112)
CO2 SERPL-SCNC: 22 MMOL/L (ref 21–32)
COMPLEXED PSA SERPL-MCNC: 0.5 NG/ML (ref ?–4)
CREAT BLD-MCNC: 1.48 MG/DL
EGFRCR SERPLBLD CKD-EPI 2021: 53 ML/MIN/1.73M2 (ref 60–?)
EOSINOPHIL # BLD AUTO: 0.32 X10(3) UL (ref 0–0.7)
EOSINOPHIL NFR BLD AUTO: 5.2 %
ERYTHROCYTE [DISTWIDTH] IN BLOOD BY AUTOMATED COUNT: 12.1 %
EST. AVERAGE GLUCOSE BLD GHB EST-MCNC: 269 MG/DL (ref 68–126)
FASTING STATUS PATIENT QL REPORTED: YES
GLOBULIN PLAS-MCNC: 3.5 G/DL (ref 2.8–4.4)
GLUCOSE BLD-MCNC: 233 MG/DL (ref 70–99)
HBA1C MFR BLD: 11 % (ref ?–5.7)
HCT VFR BLD AUTO: 42.8 %
HGB BLD-MCNC: 15 G/DL
IMM GRANULOCYTES # BLD AUTO: 0.03 X10(3) UL (ref 0–1)
IMM GRANULOCYTES NFR BLD: 0.5 %
LYMPHOCYTES # BLD AUTO: 1.48 X10(3) UL (ref 1–4)
LYMPHOCYTES NFR BLD AUTO: 24.2 %
MCH RBC QN AUTO: 30.5 PG (ref 26–34)
MCHC RBC AUTO-ENTMCNC: 35 G/DL (ref 31–37)
MCV RBC AUTO: 87 FL
MONOCYTES # BLD AUTO: 0.43 X10(3) UL (ref 0.1–1)
MONOCYTES NFR BLD AUTO: 7 %
NEUTROPHILS # BLD AUTO: 3.81 X10 (3) UL (ref 1.5–7.7)
NEUTROPHILS # BLD AUTO: 3.81 X10(3) UL (ref 1.5–7.7)
NEUTROPHILS NFR BLD AUTO: 62.3 %
OSMOLALITY SERPL CALC.SUM OF ELEC: 299 MOSM/KG (ref 275–295)
PLATELET # BLD AUTO: 201 10(3)UL (ref 150–450)
POTASSIUM SERPL-SCNC: 3.7 MMOL/L (ref 3.5–5.1)
PROT SERPL-MCNC: 7.2 G/DL (ref 6.4–8.2)
RBC # BLD AUTO: 4.92 X10(6)UL
SODIUM SERPL-SCNC: 137 MMOL/L (ref 136–145)
WBC # BLD AUTO: 6.1 X10(3) UL (ref 4–11)

## 2023-09-02 PROCEDURE — 83036 HEMOGLOBIN GLYCOSYLATED A1C: CPT

## 2023-09-02 PROCEDURE — 85025 COMPLETE CBC W/AUTO DIFF WBC: CPT

## 2023-09-02 PROCEDURE — 80053 COMPREHEN METABOLIC PANEL: CPT

## 2023-09-02 PROCEDURE — 36415 COLL VENOUS BLD VENIPUNCTURE: CPT

## 2023-09-02 PROCEDURE — 3046F HEMOGLOBIN A1C LEVEL >9.0%: CPT | Performed by: INTERNAL MEDICINE

## 2023-09-09 ENCOUNTER — OFFICE VISIT (OUTPATIENT)
Dept: INTERNAL MEDICINE CLINIC | Facility: CLINIC | Age: 63
End: 2023-09-09
Payer: COMMERCIAL

## 2023-09-09 VITALS
BODY MASS INDEX: 27.27 KG/M2 | WEIGHT: 148.19 LBS | HEART RATE: 78 BPM | OXYGEN SATURATION: 96 % | HEIGHT: 62 IN | DIASTOLIC BLOOD PRESSURE: 76 MMHG | TEMPERATURE: 97 F | SYSTOLIC BLOOD PRESSURE: 126 MMHG | RESPIRATION RATE: 16 BRPM

## 2023-09-09 DIAGNOSIS — E11.65 UNCONTROLLED TYPE 2 DIABETES MELLITUS WITH HYPERGLYCEMIA (HCC): ICD-10-CM

## 2023-09-09 DIAGNOSIS — Z00.00 ROUTINE GENERAL MEDICAL EXAMINATION AT A HEALTH CARE FACILITY: Primary | ICD-10-CM

## 2023-09-09 DIAGNOSIS — E78.5 HYPERLIPIDEMIA LDL GOAL <100: ICD-10-CM

## 2023-09-09 DIAGNOSIS — N28.9 RENAL INSUFFICIENCY: ICD-10-CM

## 2023-09-09 DIAGNOSIS — I10 ESSENTIAL HYPERTENSION: ICD-10-CM

## 2023-09-09 DIAGNOSIS — F17.200 SMOKER: ICD-10-CM

## 2023-09-09 PROCEDURE — 3074F SYST BP LT 130 MM HG: CPT | Performed by: INTERNAL MEDICINE

## 2023-09-09 PROCEDURE — 99396 PREV VISIT EST AGE 40-64: CPT | Performed by: INTERNAL MEDICINE

## 2023-09-09 PROCEDURE — 3008F BODY MASS INDEX DOCD: CPT | Performed by: INTERNAL MEDICINE

## 2023-09-09 PROCEDURE — 3078F DIAST BP <80 MM HG: CPT | Performed by: INTERNAL MEDICINE

## 2023-11-25 DIAGNOSIS — E78.2 MIXED HYPERLIPIDEMIA: ICD-10-CM

## 2023-11-26 RX ORDER — ROSUVASTATIN CALCIUM 5 MG/1
TABLET, COATED ORAL
Qty: 90 TABLET | Refills: 0 | Status: SHIPPED | OUTPATIENT
Start: 2023-11-26

## 2024-01-01 ENCOUNTER — TELEPHONE (OUTPATIENT)
Dept: INTERNAL MEDICINE CLINIC | Facility: CLINIC | Age: 64
End: 2024-01-01

## 2024-01-01 DIAGNOSIS — E11.65 UNCONTROLLED TYPE 2 DIABETES MELLITUS WITH HYPERGLYCEMIA (HCC): ICD-10-CM

## 2024-01-01 RX ORDER — GLIPIZIDE 10 MG/1
10 TABLET, FILM COATED, EXTENDED RELEASE ORAL DAILY
Qty: 10 TABLET | Refills: 0 | Status: CANCELLED | OUTPATIENT
Start: 2024-01-01

## 2024-02-11 DIAGNOSIS — E11.65 UNCONTROLLED TYPE 2 DIABETES MELLITUS WITH HYPERGLYCEMIA (HCC): ICD-10-CM

## 2024-02-11 DIAGNOSIS — I10 ESSENTIAL HYPERTENSION: ICD-10-CM

## 2024-02-12 RX ORDER — GLIPIZIDE 10 MG/1
10 TABLET, FILM COATED, EXTENDED RELEASE ORAL DAILY
Qty: 30 TABLET | Refills: 0 | Status: SHIPPED | OUTPATIENT
Start: 2024-02-12 | End: 2024-04-25

## 2024-02-12 RX ORDER — LISINOPRIL AND HYDROCHLOROTHIAZIDE 20; 12.5 MG/1; MG/1
1 TABLET ORAL
Qty: 90 TABLET | Refills: 0 | Status: SHIPPED | OUTPATIENT
Start: 2024-02-12

## 2024-02-12 NOTE — TELEPHONE ENCOUNTER
Last VISIT - 9/9/23 Physical     Last CPE - 9/9/23     Last REFILL -     glipiZIDE ER 10 MG Oral Tablet 24 Hr 90 tablet 0 8/21/2023     Last LABS - 9/2/23 cbc, cmp, A1c, psa 3/4/23 lipid 3/4/23 microalbumin and tsh.    No future appointments.     Per PROTOCOL? Failed     Please Approve or Deny.

## 2024-04-25 DIAGNOSIS — E11.65 UNCONTROLLED TYPE 2 DIABETES MELLITUS WITH HYPERGLYCEMIA (HCC): ICD-10-CM

## 2024-04-25 RX ORDER — GLIPIZIDE 10 MG/1
10 TABLET, FILM COATED, EXTENDED RELEASE ORAL DAILY
Qty: 10 TABLET | Refills: 0 | Status: SHIPPED | OUTPATIENT
Start: 2024-04-25

## 2024-04-26 NOTE — TELEPHONE ENCOUNTER
10 pills given, needs to schedule in DM clinic or with endo. Uncontrolled diabetes and I don't see him making any appointments, he refused med changes last time I saw him.

## 2024-06-16 NOTE — TELEPHONE ENCOUNTER
Please review. Protocol failed / No protocol.    Per telephone encounter 4/25/24:  Luz Doss MD 4/25/24  8:38 PM Note  10 pills given, needs to schedule in DM clinic or with endo. Uncontrolled diabetes and I don't see him making any appointments, he refused med changes last time I saw him.             Requested Prescriptions   Pending Prescriptions Disp Refills    GLIPIZIDE ER 10 MG Oral Tablet 24 Hr [Pharmacy Med Name: glipiZIDE ER 10 MG Oral Tablet Extended Release 24 Hour] 10 tablet 0     Sig: Take 1 tablet by mouth once daily       Diabetes Medication Protocol Failed - 6/12/2024  6:00 AM        Failed - Last A1C < 7.5 and within past 6 months     Lab Results   Component Value Date    A1C 11.0 (H) 09/02/2023             Failed - In person appointment or virtual visit in the past 6 mos or appointment in next 3 mos     Recent Outpatient Visits              9 months ago Routine general medical examination at a health care facility    [address]    Office Visit    1 year ago Uncontrolled type 2 diabetes mellitus with hyperglycemia (HCC)    [address] Luz Sutton MD    Office Visit    2 years ago Routine general medical examination at a health care facility    [address]    Office Visit    2 years ago Uncontrolled type 2 diabetes mellitus with hyperglycemia (HCC)    [address]    Office Visit    3 years ago Routine general medical examination at a health care facility    [address]    Office Visit                      Failed - Microalbumin procedure in past 12 months or taking ACE/ARB        Passed - EGFRCR or GFRNAA > 50     GFR Evaluation  EGFRCR: 53 , resulted on 9/2/2023          Passed - GFR in the past 12 months              Recent Outpatient Visits              9 months ago Routine general medical examination at a health care facility    [facility], [address] [place] [address], Luz Sutton MD    Office Visit    1 year ago Uncontrolled type 2 diabetes mellitus with hyperglycemia (HCC)    [facility], [address] [place], [place] [address]Alicia Tina, MD    Office Visit    2 years ago Routine general medical examination at a health care facility    [facility], [address] [place], [place] [address]Alicia Tina, MD    Office Visit    2 years ago Uncontrolled type 2 diabetes mellitus with hyperglycemia (HCC)    [facility], [address] [place] [address], Luz Sutton MD    Office Visit    3 years ago Routine general medical examination at a health care facility    [facility], [address] [place], [place] [address]Alicia Tina, MD    Office Visit

## 2025-07-07 NOTE — LETTER
July 9, 2025       Fredrick Machuca Jr.  [address]  [place] [address]    Dear Mr. Machuca,    Your procedure is scheduled with Antolin Harden MD on October 6, 2025, at [facility]. You can expect to be contacted 1 to 3 days prior to the surgery to confirm arrival and surgery time. Occasionally these times may change.    Please arrive to register for your procedure the address of the facility is:    [address]  [address] [phone]    The following appointment(s) have been scheduled for you:     Please schedule Preop exam with PCP 2 weeks prior to procedure no more than 30 days. Thank  you      2 weeks post op with Dr Harden at the [address] on October 21, 2025 at 3:45 pm arrive at 3:30 PM. Thank you    Here are instructions for your surgery:     Do not eat or drink 8 hours before your surgery.    You will need someone to drive you home and remain with you up to 24 hours after you have been discharged.     If you have any questions or need to reschedule, please contact me at the telephone number and extension listed below.     Thank you,    Ama COLEMAN [phone]  Surgery Scheduler for Antolin Harden MD   [facility] Pre-Admit Department   July 17, 2017    [address] [facility]      Dear Parrish Antonio: The following are the results of your recent tests. Please review the list of test results.   Your result is the value on the left; we have also supplied the r Result Value Ref Range   Free T4 1.2 0.9 - 1.8 ng/dL   TSH <0.005 (L) 0.350 - 5.500 mIU/mL   -CBC W/ DIFFERENTIAL   Result Value Ref Range   WBC 6.1 4.0 - 13.0 x10(3) uL   RBC 4.71 4.30 - 5.70 x10(6)uL   HGB 14.1 13.0 - 17.0 g/dL   HCT 41.3 37.0 - 53.0 %

## (undated) DIAGNOSIS — I10 ESSENTIAL HYPERTENSION: ICD-10-CM

## (undated) DIAGNOSIS — Z13.220 SCREENING FOR LIPID DISORDERS: ICD-10-CM

## (undated) DIAGNOSIS — Z12.11 ENCOUNTER FOR SCREENING FECAL OCCULT BLOOD TESTING: Primary | ICD-10-CM

## (undated) DIAGNOSIS — E11.65 UNCONTROLLED TYPE 2 DIABETES MELLITUS WITH HYPERGLYCEMIA (HCC): ICD-10-CM

## (undated) DIAGNOSIS — Z13.0 SCREENING FOR BLOOD DISEASE: ICD-10-CM

## (undated) DIAGNOSIS — Z13.29 SCREENING FOR THYROID DISORDER: ICD-10-CM

## (undated) DIAGNOSIS — Z13.228 SCREENING FOR METABOLIC DISORDER: ICD-10-CM

## (undated) DIAGNOSIS — Z00.00 ROUTINE GENERAL MEDICAL EXAMINATION AT A HEALTH CARE FACILITY: ICD-10-CM

## (undated) NOTE — LETTER
12/04/18        [address] [facility] [address]      Dear uSzy Alexis records indicate that you have outstanding lab work and or testing that was ordered for you and has not yet been completed:  Orders Placed This Encounter

## (undated) NOTE — LETTER
06/30/17        [address] [facility] [address]      Dear Flower Youngblood records indicate that you have outstanding lab work and or testing that was ordered for you and has not yet been completed:          Hemoglobin A1C [E]

## (undated) NOTE — LETTER
09/24/19        [address] [facility] [address]      Dear Marcos Most records indicate that you have outstanding lab work and or testing that was ordered for you and has not yet been completed:  Orders Placed This Encounter

## (undated) NOTE — MR AVS SNAPSHOT
[address] [facility] [address]  [facility],  [address] [facility] [phone]               Thank you for choosing us for your health care visit with JOSELUIS Tony.   We are glad to serve you and happy to provide you with this care provider uses a term you’re not sure of, don’t be afraid to ask. It helps to know the difference between servings and portions:  · A serving size is a fixed size. Food producers use this term to describe their products.  For example, the label on a cer substitute for professional medical care. Always follow your healthcare professional's instructions. Nutrition and MyPlate: Vegetables    Vegetables are a major source of fiber. They’re also packed with vitamins needed for health and growth.  At meal substitute for professional medical care. Always follow your healthcare professional's instructions. Follow Up with Our Office     Return in about 3 months (around 4/14/2017) for diabetes.       Allergies as of Jan 14, 2017     No Known Allergie Assoc Dx:  Uncontrolled type 2 diabetes mellitus with hyperglycemia, without long-term current use of insulin ([facility] [address] [E11.65], Mixed hyperlipidemia [E78.2], Essential hypertension [I10]                 Referral Details     Referred By    Referred To    War HEMOGLOBIN A1C 8.2 4.3 - 5.6 %    Cartridge Lot# [id number] Numeric    Cartridge Expiration Date 10/30/2018 Date                  [facility]     Sign up for [facility], your secure online medical record.   [facility] will allow you to access patient instructions from your

## (undated) NOTE — LETTER
11/17/20        [address] [facility] [address]      Dear Delonte Owens records indicate that you have outstanding lab work and or testing that was ordered for you and has not yet been completed:  Orders Placed This Encounter

## (undated) NOTE — MR AVS SNAPSHOT
[facility] [address] [address] [phone]               Thank you for choosing us for your health care visit with Ignacia Garay MD.  We are glad to serve you and happy to provide you with this summar hours group (Topics: Blood glucose monitoring, nutrition, medication management, exercise)    Note:  Medicare covers 10 hours initial DSMT (Diabetes Self-Management Training) in a 12-month period for the first year of diagnosis, plus 2 hours follow-up DSMT authorization such as [place] or O plans, you may schedule your appointment immediately. However, if you are unsure about the requirements for your authorization, please wait 5-7 days and then you can contact your physician's office.  At that ti TEST DAILY AS DIRECTED           Rosuvastatin Calcium 10 MG Tabs   Take 1 tablet (10 mg total) by mouth nightly. Commonly known as:  CRESTOR           VSL#3 Caps   Take 1 capsule by mouth daily.                 Where to Get Your Medications      These med Eat plenty of low-fat dairy products High fat meats and dairy   Choose whole grain products Foods high in sodium   Water is best for hydration Fast food.    Eat at home when possible     Tips for increasing your physical activity – Adults who are physically

## (undated) NOTE — LETTER
06/25/20        [address] [facility] [address]      Dear Christina Jarrell records indicate that you have outstanding lab work and or testing that was ordered for you and has not yet been completed:  Orders Placed This Encounter

## (undated) NOTE — Clinical Note
[facility], [address] [facility], [address] [address] [address] [place] [address], [facility] [address] [facility] [phone]        Sadaf Shaw MD  • Arielle Tang MD • Mynor Gray MD • DO Korina Esparza PA-C • JOSELUIS Canseco